# Patient Record
Sex: MALE | Race: WHITE | Employment: UNEMPLOYED | URBAN - NONMETROPOLITAN AREA
[De-identification: names, ages, dates, MRNs, and addresses within clinical notes are randomized per-mention and may not be internally consistent; named-entity substitution may affect disease eponyms.]

---

## 2018-07-01 ENCOUNTER — HOSPITAL ENCOUNTER (INPATIENT)
Age: 28
LOS: 1 days | Discharge: HOME | DRG: 383 | End: 2018-07-02
Attending: EMERGENCY MEDICINE | Admitting: INTERNAL MEDICINE
Payer: MEDICAID

## 2018-07-01 ENCOUNTER — APPOINTMENT (OUTPATIENT)
Dept: GENERAL RADIOLOGY | Age: 28
DRG: 383 | End: 2018-07-01
Payer: MEDICAID

## 2018-07-01 DIAGNOSIS — L08.9 LEFT FOOT INFECTION: Primary | ICD-10-CM

## 2018-07-01 PROBLEM — L03.119 CELLULITIS OF FOOT: Status: ACTIVE | Noted: 2018-07-01

## 2018-07-01 LAB
ALBUMIN SERPL-MCNC: 3.3 G/DL (ref 3.5–5.2)
ALBUMIN/GLOBULIN RATIO: 1.1 (ref 1–2.5)
ALP BLD-CCNC: 52 U/L (ref 40–129)
ALT SERPL-CCNC: 41 U/L (ref 5–41)
AMPHETAMINE SCREEN URINE: NEGATIVE
ANION GAP SERPL CALCULATED.3IONS-SCNC: 12 MMOL/L (ref 9–17)
AST SERPL-CCNC: 30 U/L
BARBITURATE SCREEN URINE: NEGATIVE
BENZODIAZEPINE SCREEN, URINE: POSITIVE
BILIRUB SERPL-MCNC: 0.52 MG/DL (ref 0.3–1.2)
BUN BLDV-MCNC: 13 MG/DL (ref 6–20)
BUN/CREAT BLD: 22 (ref 9–20)
BUPRENORPHINE URINE: POSITIVE
CALCIUM SERPL-MCNC: 7.9 MG/DL (ref 8.6–10.4)
CANNABINOID SCREEN URINE: NEGATIVE
CHLORIDE BLD-SCNC: 104 MMOL/L (ref 98–107)
CO2: 27 MMOL/L (ref 20–31)
COCAINE METABOLITE, URINE: POSITIVE
CREAT SERPL-MCNC: 0.6 MG/DL (ref 0.7–1.2)
GFR AFRICAN AMERICAN: >60 ML/MIN
GFR NON-AFRICAN AMERICAN: >60 ML/MIN
GFR SERPL CREATININE-BSD FRML MDRD: ABNORMAL ML/MIN/{1.73_M2}
GFR SERPL CREATININE-BSD FRML MDRD: ABNORMAL ML/MIN/{1.73_M2}
GLUCOSE BLD-MCNC: 112 MG/DL (ref 70–99)
HCT VFR BLD CALC: 38 % (ref 40.7–50.3)
HEMOGLOBIN: 13.1 G/DL (ref 13–17)
MCH RBC QN AUTO: 29.9 PG (ref 25.2–33.5)
MCHC RBC AUTO-ENTMCNC: 34.5 G/DL (ref 28.4–34.8)
MCV RBC AUTO: 86.8 FL (ref 82.6–102.9)
MDMA URINE: ABNORMAL
METHADONE SCREEN, URINE: NEGATIVE
METHAMPHETAMINE, URINE: NEGATIVE
NRBC AUTOMATED: 0 PER 100 WBC
OPIATES, URINE: POSITIVE
OXYCODONE SCREEN URINE: NEGATIVE
PDW BLD-RTO: 12.2 % (ref 11.8–14.4)
PHENCYCLIDINE, URINE: NEGATIVE
PLATELET # BLD: 228 K/UL (ref 138–453)
PMV BLD AUTO: 10.6 FL (ref 8.1–13.5)
POTASSIUM SERPL-SCNC: 3.6 MMOL/L (ref 3.7–5.3)
PROPOXYPHENE, URINE: NEGATIVE
RBC # BLD: 4.38 M/UL (ref 4.21–5.77)
SODIUM BLD-SCNC: 143 MMOL/L (ref 135–144)
TEST INFORMATION: ABNORMAL
TOTAL PROTEIN: 6.2 G/DL (ref 6.4–8.3)
TRICYCLIC ANTIDEPRESSANTS, UR: NEGATIVE
WBC # BLD: 5.8 K/UL (ref 3.5–11.3)

## 2018-07-01 PROCEDURE — 73630 X-RAY EXAM OF FOOT: CPT

## 2018-07-01 PROCEDURE — 1200000000 HC SEMI PRIVATE

## 2018-07-01 PROCEDURE — 2580000003 HC RX 258: Performed by: INTERNAL MEDICINE

## 2018-07-01 PROCEDURE — 99284 EMERGENCY DEPT VISIT MOD MDM: CPT

## 2018-07-01 PROCEDURE — 6360000002 HC RX W HCPCS: Performed by: INTERNAL MEDICINE

## 2018-07-01 PROCEDURE — 6360000002 HC RX W HCPCS: Performed by: EMERGENCY MEDICINE

## 2018-07-01 PROCEDURE — 2580000003 HC RX 258: Performed by: EMERGENCY MEDICINE

## 2018-07-01 PROCEDURE — 96375 TX/PRO/DX INJ NEW DRUG ADDON: CPT

## 2018-07-01 PROCEDURE — 96365 THER/PROPH/DIAG IV INF INIT: CPT

## 2018-07-01 PROCEDURE — G0378 HOSPITAL OBSERVATION PER HR: HCPCS

## 2018-07-01 PROCEDURE — 80306 DRUG TEST PRSMV INSTRMNT: CPT

## 2018-07-01 PROCEDURE — 85027 COMPLETE CBC AUTOMATED: CPT

## 2018-07-01 PROCEDURE — 96366 THER/PROPH/DIAG IV INF ADDON: CPT

## 2018-07-01 PROCEDURE — 96376 TX/PRO/DX INJ SAME DRUG ADON: CPT

## 2018-07-01 PROCEDURE — 87040 BLOOD CULTURE FOR BACTERIA: CPT

## 2018-07-01 PROCEDURE — 80053 COMPREHEN METABOLIC PANEL: CPT

## 2018-07-01 PROCEDURE — 36415 COLL VENOUS BLD VENIPUNCTURE: CPT

## 2018-07-01 RX ORDER — SODIUM CHLORIDE 9 MG/ML
INJECTION, SOLUTION INTRAVENOUS CONTINUOUS
Status: DISCONTINUED | OUTPATIENT
Start: 2018-07-01 | End: 2018-07-02 | Stop reason: HOSPADM

## 2018-07-01 RX ORDER — FAMOTIDINE 20 MG/1
20 TABLET, FILM COATED ORAL 2 TIMES DAILY
Status: DISCONTINUED | OUTPATIENT
Start: 2018-07-01 | End: 2018-07-02 | Stop reason: HOSPADM

## 2018-07-01 RX ORDER — CALCIUM CARBONATE 200(500)MG
2 TABLET,CHEWABLE ORAL DAILY
Status: DISCONTINUED | OUTPATIENT
Start: 2018-07-01 | End: 2018-07-02 | Stop reason: HOSPADM

## 2018-07-01 RX ORDER — MORPHINE SULFATE 10 MG/ML
4 INJECTION, SOLUTION INTRAMUSCULAR; INTRAVENOUS
Status: DISCONTINUED | OUTPATIENT
Start: 2018-07-01 | End: 2018-07-02 | Stop reason: HOSPADM

## 2018-07-01 RX ORDER — IBUPROFEN 800 MG/1
800 TABLET ORAL EVERY 6 HOURS PRN
Status: DISCONTINUED | OUTPATIENT
Start: 2018-07-01 | End: 2018-07-02 | Stop reason: HOSPADM

## 2018-07-01 RX ORDER — MORPHINE SULFATE 4 MG/ML
4 INJECTION, SOLUTION INTRAMUSCULAR; INTRAVENOUS ONCE
Status: DISCONTINUED | OUTPATIENT
Start: 2018-07-01 | End: 2018-07-01

## 2018-07-01 RX ORDER — SODIUM CHLORIDE 0.9 % (FLUSH) 0.9 %
10 SYRINGE (ML) INJECTION PRN
Status: DISCONTINUED | OUTPATIENT
Start: 2018-07-01 | End: 2018-07-02 | Stop reason: HOSPADM

## 2018-07-01 RX ORDER — MORPHINE SULFATE 10 MG/ML
2 INJECTION, SOLUTION INTRAMUSCULAR; INTRAVENOUS
Status: DISCONTINUED | OUTPATIENT
Start: 2018-07-01 | End: 2018-07-02 | Stop reason: HOSPADM

## 2018-07-01 RX ORDER — ONDANSETRON 2 MG/ML
4 INJECTION INTRAMUSCULAR; INTRAVENOUS EVERY 6 HOURS PRN
Status: DISCONTINUED | OUTPATIENT
Start: 2018-07-01 | End: 2018-07-02 | Stop reason: HOSPADM

## 2018-07-01 RX ORDER — SODIUM CHLORIDE 0.9 % (FLUSH) 0.9 %
10 SYRINGE (ML) INJECTION EVERY 12 HOURS SCHEDULED
Status: DISCONTINUED | OUTPATIENT
Start: 2018-07-01 | End: 2018-07-02 | Stop reason: HOSPADM

## 2018-07-01 RX ADMIN — Medication 10 ML: at 20:12

## 2018-07-01 RX ADMIN — SODIUM CHLORIDE: 9 INJECTION, SOLUTION INTRAVENOUS at 20:14

## 2018-07-01 RX ADMIN — CEFAZOLIN SODIUM 1 G: 1 SOLUTION INTRAVENOUS at 20:12

## 2018-07-01 RX ADMIN — MORPHINE SULFATE 4 MG: 10 INJECTION INTRAVENOUS at 20:12

## 2018-07-01 RX ADMIN — MORPHINE SULFATE 4 MG: 10 INJECTION INTRAVENOUS at 22:19

## 2018-07-01 RX ADMIN — VANCOMYCIN HYDROCHLORIDE 1000 MG: 1 INJECTION, POWDER, LYOPHILIZED, FOR SOLUTION INTRAVENOUS at 16:58

## 2018-07-01 ASSESSMENT — ENCOUNTER SYMPTOMS
COLOR CHANGE: 1
RESPIRATORY NEGATIVE: 1
GASTROINTESTINAL NEGATIVE: 1

## 2018-07-01 ASSESSMENT — PAIN DESCRIPTION - LOCATION
LOCATION: LEG;FOOT
LOCATION: LEG

## 2018-07-01 ASSESSMENT — PAIN DESCRIPTION - PAIN TYPE
TYPE: ACUTE PAIN
TYPE: ACUTE PAIN

## 2018-07-01 ASSESSMENT — PAIN SCALES - GENERAL
PAINLEVEL_OUTOF10: 8
PAINLEVEL_OUTOF10: 4
PAINLEVEL_OUTOF10: 7

## 2018-07-01 ASSESSMENT — PAIN DESCRIPTION - ORIENTATION
ORIENTATION: LEFT;LOWER
ORIENTATION: LEFT

## 2018-07-01 ASSESSMENT — PAIN DESCRIPTION - DESCRIPTORS
DESCRIPTORS: NUMBNESS
DESCRIPTORS: THROBBING

## 2018-07-01 NOTE — ED PROVIDER NOTES
[facility] MED SURG  eMERGENCY dEPARTMENT eNCOUnter      Pt Name: Shemar Bentley  MRN: [id number]  [place] 1990  Date of evaluation: 7/1/2018  Provider: Gopi Sharma MD    CHIEF COMPLAINT       Chief Complaint   Patient presents with    Foot Pain     Onset 4 days ago, no known injury. C/o pain and redness, radiating to lower leg         HISTORY OF PRESENT ILLNESS   (Location/Symptom, Timing/Onset, Context/Setting, Quality, Duration, Modifying Factors, Severity)  Note limiting factors. Shemar Bentley is a 29 y.o. male who presents to the emergency department      HPIThis is a 70-year-old male who presents with left foot pain that began approximate 4 days ago. He denies any wound or injury that started the foot infection. He just noticed he had pain in the redness and swelling continued to worsen. He has been afebrile. Nursing Notes were reviewed. REVIEW OF SYSTEMS    (2-9 systems for level 4, 10 or more for level 5)     Review of Systems   Constitutional: Negative. HENT: Negative. Respiratory: Negative. Cardiovascular: Negative. Gastrointestinal: Negative. Musculoskeletal: Negative. Skin: Positive for color change and rash. Neurological: Negative. Hematological: Negative. Except as noted above the remainder of the review of systems was reviewed and negative.        PAST MEDICAL HISTORY     Past Medical History:   Diagnosis Date    HTN (hypertension) 12/10/2012    Morbid obesity ([address] 12/10/2012         SURGICAL HISTORY       Past Surgical History:   Procedure Laterality Date    WISDOM TOOTH EXTRACTION      age 25         CURRENT MEDICATIONS       Discharge Medication List as of 7/2/2018 12:39 PM      CONTINUE these medications which have NOT CHANGED    Details   calcium carbonate (TUMS) 500 MG chewable tablet Take 2 tablets by mouth daily      ibuprofen (ADVIL;MOTRIN) 200 MG tablet Take 800 mg by mouth every 6 hours as needed for Pain       omeprazole (PRILOSEC) 40 MG capsule Take 1 capsule by mouth 2 times daily. , Disp-60 capsule, R-3             ALLERGIES     Tramadol and Zithromax [azithromycin]    FAMILY HISTORY       Family History   Problem Relation Age of Onset    Diabetes Father     Diabetes Maternal Grandfather     Diabetes Paternal Grandmother     Diabetes Paternal Grandfather           SOCIAL HISTORY       Social History     Social History    Marital status: Single     Spouse name: N/A    Number of children: N/A    Years of education: N/A     Social History Main Topics    Smoking status: Current Every Day Smoker     Packs/day: 1.00     Types: Cigarettes     Last attempt to quit: 12/14/2012    Smokeless tobacco: Not on file    Alcohol use No    Drug use: No    Sexual activity: Not on file     Other Topics Concern    Not on file     Social History Narrative    No narrative on file       SCREENINGS    [place] Coma Scale  Eye Opening: Spontaneous  Best Verbal Response: Oriented  Best Motor Response: Obeys commands  [place] Coma Scale Score: 15        PHYSICAL EXAM    (up to 7 for level 4, 8 or more for level 5)     ED Triage Vitals [07/01/18 1322]   BP Temp Temp Source Pulse Resp SpO2 Height Weight   (!) 155/83 97.5 °F (36.4 °C) Tympanic 72 16 97 % 6' 5\" (1.956 m) (!) 325 lb (147.4 kg)       Physical Exam   Constitutional: He is oriented to person, place, and time. He appears well-developed and well-nourished. HENT:   Head: Normocephalic and atraumatic. Right Ear: External ear normal.   Left Ear: External ear normal.   Nose: Nose normal.   Eyes: Conjunctivae and EOM are normal. Pupils are equal, round, and reactive to light. Neck: Normal range of motion. Neck supple. Cardiovascular: Normal rate, regular rhythm, normal heart sounds and intact distal pulses. Pulmonary/Chest: Effort normal and breath sounds normal.   Abdominal: Soft. Bowel sounds are normal. There is no tenderness. Musculoskeletal: Normal range of motion. IMMATURE PLATELET FRACTION   URINE DRUG SCREEN       All other labs were within normal range or not returned as of this dictation. EMERGENCY DEPARTMENT COURSE and DIFFERENTIAL DIAGNOSIS/MDM:   Vitals:    Vitals:    07/01/18 1322 07/01/18 1930 07/02/18 0037 07/02/18 0743   BP: (!) 155/83 (!) 159/91 126/73 137/72   Pulse: 72 68 70 68   Resp: 16 16 16 16   Temp: 97.5 °F (36.4 °C) 98 °F (36.7 °C) 98 °F (36.7 °C) 97.5 °F (36.4 °C)   TempSrc: Tympanic Temporal Temporal Temporal   SpO2: 97% 98% 98% 98%   Weight: (!) 325 lb (147.4 kg) (!) 424 lb (192.3 kg) (!) 425 lb (192.8 kg)    Height: 6' 5\" (1.956 m)            MDM          Procedures    FINAL IMPRESSION      1. Left foot infection          DISPOSITION/PLAN   DISPOSITION Admitted 07/01/2018 06:58:15 PM      PATIENT REFERRED TO:  WEST Lawrence CNP  [address] [place] [address]  [phone]    Go on 7/12/2018  at 2:00pm      DISCHARGE MEDICATIONS:  Discharge Medication List as of 7/2/2018 12:39 PM      START taking these medications    Details   clindamycin (CLEOCIN) 300 MG capsule Take 2 capsules by mouth 3 times daily for 10 days, Disp-60 capsule, R-0Normal                    Summation      Patient Course:      ED Medications administered this visit:    Medications   vancomycin 1000 mg IVPB in 250 mL D5W addavial (0 mg Intravenous Stopped 7/1/18 1806)       New Prescriptions from this visit:    Discharge Medication List as of 7/2/2018 12:39 PM      START taking these medications    Details   clindamycin (CLEOCIN) 300 MG capsule Take 2 capsules by mouth 3 times daily for 10 days, Disp-60 capsule, R-0Normal             Follow-up:  WEST Lawrence CNP  [address] [place] [address]  [phone]    Go on 7/12/2018  at 2:00pm        Final Impression:   1.  Left foot infection               (Please note that portions of this note were completed with a voice recognition program.  Efforts were made to edit the dictations but occasionally words are mis-transcribed.)           Kya Vilchis MD  07/01/18 [address] MD  07/01/18 [address], MD  07/13/18 8815

## 2018-07-01 NOTE — ED NOTES
IV attempted x 2, without success after missed attempts x 2 by WINTER Hall. Pt calm, cooperative.      Smith Joya RN  07/01/18 2260

## 2018-07-01 NOTE — ED NOTES
In room to discuss patient concerns with him. Verbalized understanding of issues. Explained complications of cellulitis including sepsis and potential disability due to infection. Also voiced that due to difficulty of IV access we didn't want his condition to deteriorate. Asked when IV ATB would be completed. Advised patient pump would alarm and he should call nursing staff.  Favio Salmon RN

## 2018-07-02 ENCOUNTER — APPOINTMENT (OUTPATIENT)
Dept: VASCULAR LAB | Age: 28
DRG: 383 | End: 2018-07-02
Payer: MEDICAID

## 2018-07-02 VITALS
DIASTOLIC BLOOD PRESSURE: 72 MMHG | TEMPERATURE: 97.5 F | HEIGHT: 77 IN | HEART RATE: 68 BPM | OXYGEN SATURATION: 98 % | SYSTOLIC BLOOD PRESSURE: 137 MMHG | WEIGHT: 315 LBS | RESPIRATION RATE: 16 BRPM | BODY MASS INDEX: 37.19 KG/M2

## 2018-07-02 PROBLEM — L03.116 CELLULITIS OF FOOT, LEFT: Status: ACTIVE | Noted: 2018-07-01

## 2018-07-02 PROBLEM — F19.20 POLYDRUG DEPENDENCE INCLUDING OPIOID TYPE DRUG, EPISODIC ABUSE (HCC): Chronic | Status: ACTIVE | Noted: 2018-07-02

## 2018-07-02 PROBLEM — F11.20 POLYDRUG DEPENDENCE INCLUDING OPIOID TYPE DRUG, EPISODIC ABUSE (HCC): Chronic | Status: ACTIVE | Noted: 2018-07-02

## 2018-07-02 PROBLEM — Z78.9 PROBLEM WITH VASCULAR ACCESS: Status: ACTIVE | Noted: 2018-07-02

## 2018-07-02 LAB
ABSOLUTE EOS #: 0.48 K/UL (ref 0–0.44)
ABSOLUTE IMMATURE GRANULOCYTE: 0 K/UL (ref 0–0.3)
ABSOLUTE LYMPH #: 1.85 K/UL (ref 1.1–3.7)
ABSOLUTE MONO #: 0.22 K/UL (ref 0.1–1.2)
BASOPHILS # BLD: 0 % (ref 0–2)
BASOPHILS ABSOLUTE: 0 K/UL (ref 0–0.2)
DIFFERENTIAL TYPE: ABNORMAL
EOSINOPHILS RELATIVE PERCENT: 11 % (ref 1–4)
HCT VFR BLD CALC: 35.8 % (ref 40.7–50.3)
HEMOGLOBIN: 12.3 G/DL (ref 13–17)
IMMATURE GRANULOCYTES: 0 %
LYMPHOCYTES # BLD: 42 % (ref 24–43)
MCH RBC QN AUTO: 29.6 PG (ref 25.2–33.5)
MCHC RBC AUTO-ENTMCNC: 34.4 G/DL (ref 28.4–34.8)
MCV RBC AUTO: 86.3 FL (ref 82.6–102.9)
MONOCYTES # BLD: 5 % (ref 3–12)
MORPHOLOGY: NORMAL
NRBC AUTOMATED: 0 PER 100 WBC
PDW BLD-RTO: 12.1 % (ref 11.8–14.4)
PLATELET # BLD: ABNORMAL K/UL (ref 138–453)
PLATELET ESTIMATE: ABNORMAL
PLATELET, FLUORESCENCE: 143 K/UL (ref 138–453)
PLATELET, IMMATURE FRACTION: 6.5 % (ref 1.1–10.3)
PMV BLD AUTO: ABNORMAL FL (ref 8.1–13.5)
RBC # BLD: 4.15 M/UL (ref 4.21–5.77)
RBC # BLD: ABNORMAL 10*6/UL
SEG NEUTROPHILS: 42 % (ref 36–65)
SEGMENTED NEUTROPHILS ABSOLUTE COUNT: 1.85 K/UL (ref 1.5–8.1)
WBC # BLD: 4.4 K/UL (ref 3.5–11.3)
WBC # BLD: ABNORMAL 10*3/UL

## 2018-07-02 PROCEDURE — 96367 TX/PROPH/DG ADDL SEQ IV INF: CPT

## 2018-07-02 PROCEDURE — 36415 COLL VENOUS BLD VENIPUNCTURE: CPT

## 2018-07-02 PROCEDURE — G0378 HOSPITAL OBSERVATION PER HR: HCPCS

## 2018-07-02 PROCEDURE — 6360000002 HC RX W HCPCS: Performed by: INTERNAL MEDICINE

## 2018-07-02 PROCEDURE — 96376 TX/PRO/DX INJ SAME DRUG ADON: CPT

## 2018-07-02 PROCEDURE — 85025 COMPLETE CBC W/AUTO DIFF WBC: CPT

## 2018-07-02 PROCEDURE — 93971 EXTREMITY STUDY: CPT

## 2018-07-02 RX ORDER — CLINDAMYCIN HYDROCHLORIDE 300 MG/1
600 CAPSULE ORAL 3 TIMES DAILY
Qty: 60 CAPSULE | Refills: 0 | Status: SHIPPED | OUTPATIENT
Start: 2018-07-02 | End: 2018-07-12

## 2018-07-02 RX ADMIN — CEFAZOLIN SODIUM 1 G: 1 SOLUTION INTRAVENOUS at 03:35

## 2018-07-02 RX ADMIN — MORPHINE SULFATE 4 MG: 10 INJECTION INTRAVENOUS at 07:45

## 2018-07-02 RX ADMIN — MORPHINE SULFATE 4 MG: 10 INJECTION INTRAVENOUS at 00:37

## 2018-07-02 RX ADMIN — MORPHINE SULFATE 4 MG: 10 INJECTION INTRAVENOUS at 03:35

## 2018-07-02 ASSESSMENT — PAIN SCALES - GENERAL
PAINLEVEL_OUTOF10: 7

## 2018-07-02 NOTE — PROGRESS NOTES
Writer has made the patient comfortable with pillow support and the patient is resting quietly in the bed with his eyes closed. Patient had stated earlier that he might need something for sleep as he had not been able to get good rest lately but patient is currently sleeping in bed and was barely able to stay awake when writer brought in his new ID band.

## 2018-07-02 NOTE — PROGRESS NOTES
Patient woke and requested his PRN morphine. After giving the med, patient stated \"can you wake me up for my next one at 2? \" Writer explained we are not allowed to wake up patients for PRN medications. Patient was compliant and said he understand it was ok since if he was sleeping he wouldn't be feeling pain. Patient is now resting with eyes closed again.

## 2018-07-02 NOTE — H&P
Hospital Medicine  History and Physical    Patient:  Мария Jacobo  MRN: [id number]    Chief Complaint:  Left foot pain    History Obtained From:  patient  PCP: No primary care provider on file. History of Present Illness: The patient is a 29 y.o. male who presents with pain redness and swelling left foot. Started on 6/27. Denies injury. Seen in ER and admitted for IV antibiotic therapy. Past Medical History:        Diagnosis Date    HTN (hypertension) 12/10/2012    Morbid obesity ([address] 12/10/2012       Past Surgical History:        Procedure Laterality Date    WISDOM TOOTH EXTRACTION      age 25       Medications Prior to Admission:    Prior to Admission medications    Medication Sig Start Date End Date Taking? Authorizing Provider   HYDROcodone-acetaminophen (NORCO) 5-325 MG per tablet Take 1 tablet by mouth every 4 hours as needed for Pain 7/12/16   Lindsay Ch MD   calcium carbonate (TUMS) 500 MG chewable tablet Take 2 tablets by mouth daily    Historical Provider, MD   ibuprofen (ADVIL;MOTRIN) 200 MG tablet Take 800 mg by mouth every 6 hours as needed for Pain     Historical Provider, MD   omeprazole (PRILOSEC) 40 MG capsule Take 1 capsule by mouth 2 times daily. 12/10/12   WEST Bauer - CNP       Allergies:  Tramadol and Zithromax [azithromycin]    Social History:   TOBACCO:   reports that he has been smoking Cigarettes. He has been smoking about 1.00 pack per day. He does not have any smokeless tobacco history on file. ETOH:   reports that he does not drink alcohol.   OCCUPATION: none    Family History:   Family History   Problem Relation Age of Onset    Diabetes Father     Diabetes Maternal Grandfather     Diabetes Paternal Grandmother     Diabetes Paternal Grandfather        Review of Systems:  Constitutional: negative for anorexia, chills, fatigue, fevers, malaise, sweats and weight loss  Eyes: negative  Ears, nose, mouth, throat, and face: negative  Respiratory: negative for asthma, chronic bronchitis, cough, dyspnea on exertion, emphysema, hemoptysis, pleurisy/chest pain, pneumonia, shortness of breath, sputum, stridor and wheezing  Cardiovascular: negative for chest pain, chest pressure/discomfort, claudication, dyspnea, exertional chest pressure/discomfort, fatigue, irregular heart beat, lower extremity edema, near-syncope, orthopnea, palpitations, paroxysmal nocturnal dyspnea, syncope and tachypnea  Gastrointestinal: negative for abdominal pain, change in bowel habits, constipation, diarrhea, dyspepsia, dysphagia, jaundice, melena, nausea, odynophagia, reflux symptoms and vomiting  Genitourinary:negative for decreased stream, dysuria, frequency, hematuria, hesitancy, nocturia and urinary incontinence  Integument/breast: negative for changed mole, nipple discharge, pruritus, rash, skin color change and skin lesion(s)  Hematologic/lymphatic: negative for bleeding, easy bruising, lymphadenopathy and petechiae  Musculoskeletal:negative for arthralgias, back pain, bone pain, muscle weakness, myalgias, neck pain and stiff joints  Neurological: negative  Behavioral/Psych: negative  All other systems were reviewed with the patient and are negative except as stated  Physical Exam:    Vitals:   Vitals:    07/02/18 0037   BP: 126/73   Pulse: 70   Resp: 16   Temp: 98 °F (36.7 °C)   SpO2: 98%     Weight: (!) 425 lb (192.8 kg)   Height: 6' 5\" (195.6 cm)   GEN:   A & O x3, no apparent distress  EYES: No gross abnormalities.   NECK: normal, supple, no lymphadenopathy,  no carotid bruits  PULM: clear to auscultation bilaterally- no wheezes, rales or rhonchi, normal air movement, no respiratory distress  COR: regular rate & rhythm, no murmurs and no gallops  ABD:  soft, non-tender, non-distended, normal bowel sounds, no masses or organomegaly  EXT:   Erythema, pain swelling in left foot with redness up left leg  NEURO: negative  SKIN:  See

## 2018-07-02 NOTE — PROGRESS NOTES
Discussed discharge plans with the patient. Patient is a 29year old male here with Cellulitis of foot. He is alert and oriented. Patient is single and lives with his mother. He uses no medical equipment. Patient stated he cooks his food and cleans up after himself. He takes no medicine and does not drive. His family does the driving. He does not have a PCP and he will be set up with the [facility]. Patient has no insurance and the HELP program will talk with the patient. He may need help getting his medications on discharge. The discharge plan is home with no services. Ask if he wants help to get clean from his drug use . Patient stated No. Explain if he changes his mind, we can get him help.     TYRONE Chavez

## 2018-07-02 NOTE — PROGRESS NOTES
Hospital Discharge Instructions           Sundeep Mcekon         Name: Marco Route  Date:  7/2/18  Diagnosis: Cellulitis of foot [L03.119]       Follow - up Appointments:    Doctor  Location  Date  Time  Phone #  What to bring   [address] [address] 7/12/18 2:00pm [phone]                                Testing:    Doctor  Location  Date  Time  Phone #  What to bring                                        []   You have had new medications prescribed to you. Please see attached list in the After Visit Summary. []   You have had changes made to your medications. Please see attached list in the After Visit Summary.

## 2018-07-02 NOTE — PLAN OF CARE
Problem: Nutrition  Goal: Optimal nutrition therapy  Outcome: Ongoing  Nutrition Problem: Overweight/Obese  Intervention: Food and/or Nutrient Delivery: Continue current diet  Nutritional Goals: PO > 75% of meals

## 2018-07-02 NOTE — PROGRESS NOTES
LLE doppler negative for DVT. Discharging home on 10 days clindamycin. He will f/u with [facility]. Advised him to elevate his leg.     Xiomara Celaya PA-C  7/2/2018, 12:24 PM

## 2018-07-02 NOTE — PROGRESS NOTES
Anesthesia attempts IJ without success. Patient states \"you're not trying that again. \" Patient asks this nurse, \"Can the doctor just prescribe me some medication and let me go? Because I'm not getting poked again. \" Assured patient I would speak with physician. Zuleyka Wolf notified.

## 2018-07-02 NOTE — DISCHARGE SUMMARY
Patient Status: In Patient. Comments:INDICATIONS: R/O DVT, Patient reports of pain and redness of left lower extremity Patient admitted with cellulitis of the left foot. Conclusions   Summary   No evidence of superficial or deep venous thrombosis in the left lower  extremity. Signature   ----------------------------------------------------------------  Electronically signed by ALYSA Weir(Sonographer) on  07/02/2018 12:11 PM  ----------------------------------------------------------------   ----------------------------------------------------------------  Electronically signed by Titi Beckman(Interpreting  physician) on 07/02/2018 12:13 PM  ----------------------------------------------------------------  Findings:   Right Impression:               Left Impression:  Bilateral lower extremity       The common femoral, femoral, deep femoral,  venous scan was ordered, but    popliteal, tibials, peroneal, and  patient refused the right lower saphenous veins were evaluated. Poor  extremity to be evaluated. resolution of the peroneal veins was                                  noted. All other veins were compressible with                                  normal doppler responses. Edema was noted in the ankle and calf. Prominent lymph nodes were seen in the                                  groin. Velocities are measured in cm/s ; Diameters are measured in cm Left Lower Extremities DVT Study Measurements Left 2D Measurements +------------------------------------+----------+---------------+----------+ ! Location                            ! Visualized! Compressibility! Thrombosis! +------------------------------------+----------+---------------+----------+ ! Common Femoral                      !Yes       ! Yes            ! None      ! +------------------------------------+----------+---------------+----------+ !Prox Femoral                        !Yes       ! Yes            ! None      ! +------------------------------------+----------+---------------+----------+ ! Mid Femoral                         !Yes       ! Yes            ! None      ! +------------------------------------+----------+---------------+----------+ ! Dist Femoral                        !Yes       ! Yes            ! None      ! +------------------------------------+----------+---------------+----------+ ! Deep Femoral                        !Yes       ! Yes            ! None      ! +------------------------------------+----------+---------------+----------+ ! Popliteal                           !Yes       ! Yes            ! None      ! +------------------------------------+----------+---------------+----------+ ! Sapheno Femoral Junction            ! Yes       ! Yes            ! None      ! +------------------------------------+----------+---------------+----------+ ! PTV                                 ! Yes       ! Yes            ! None      ! +------------------------------------+----------+---------------+----------+ ! Peroneal                            !No        !               !          ! +------------------------------------+----------+---------------+----------+ ! Gastroc                             ! Yes       ! Yes            ! None      ! +------------------------------------+----------+---------------+----------+ ! GSV Thigh                           ! Yes       ! Yes            ! None      ! +------------------------------------+----------+---------------+----------+ ! GSV Knee                            ! Yes       ! Yes            ! None      ! +------------------------------------+----------+---------------+----------+ ! GSV Ankle                           ! Yes       ! Yes            ! None      ! +------------------------------------+----------+---------------+----------+ ! SSV                                 ! Yes       ! Yes            ! None      ! by mouth 2 times daily. Patient Instructions:    Activity: activity as tolerated  Diet: regular diet  Wound Care: elevate LLE  Other: none    Disposition:   Discharge to Home    Follow up:  Patient will be followed by new PCP in 1-2 weeks    CORE MEASURES on Discharge (if applicable)  ACE/ARB in CHF: NA  Statin in MI: NA  ASA in MI: NA  Statin in CVA: NA  Antiplatelet in CVA: NA    Total time spent on discharge services: 25 minutes    Including the following activities:  Evaluation and Management of patient  Discussion with patient and/or surrogate about current care plan  Coordination with Case Management and/or   Coordination of care with Consultants (if applicable)   Coordination of care with Receiving Facility Physician (if applicable)  Completion of DME forms (if applicable)  Preparation of Discharge Summary  Preparation of Medication Reconciliation  Preparation of Discharge Prescriptions    Signed:  Loly Jiang PA-C  7/2/2018, 12:38 PM

## 2018-07-02 NOTE — DISCHARGE SUMMARY
Eugenia Navas. Jak Guillory  Physician Assistant Attestation Note For Discharge 7/2/18    I personally evaluated and examined the patient face-to-face in conjunction with the PA and agree with the management and dispostition of the patient. Please see PA's discharge summary note for full details. My key findings are:     SUBJECTIVE:     Patient admitted for cellulitis of left foot       OBJECTIVE:    Vitals:   Temp: 97.5 °F (36.4 °C)  BP: 137/72  Resp: 16  Pulse: 68  SpO2: 98 %    24HR INTAKE/OUTPUT:    Intake/Output Summary (Last 24 hours) at 07/02/18 1352  Last data filed at 07/02/18 0800   Gross per 24 hour   Intake             1245 ml   Output                0 ml   Net             1245 ml     -----------------------------------------------------------------  Exam:  GEN:    Awake, alert and oriented x 3. no acute distress  EYES:  EOMI, pupils equal   NECK: Supple. No lymphadenopathy. No carotid bruit  CVS:    RRR, no murmur, rub or gallop  PULM: CTA, no wheezes, rales or rhonchi  ABD:    Bowels sounds normal.  Abdomen is soft. No distention. No tenderness. EXT:   no edema bilaterally . No calf tenderness. NEURO: Motor and sensory are intact  SKIN:  No rashes. No skin lesions.       Diagnostic Data:    · All available data reviewed  Lab Results   Component Value Date    WBC 4.4 07/02/2018    HGB 12.3 (L) 07/02/2018    MCV 86.3 07/02/2018    PLT See Reflexed IPF Result 07/02/2018    Lab Results   Component Value Date    GLUCOSE 112 (H) 07/01/2018    BUN 13 07/01/2018    CREATININE 0.60 (L) 07/01/2018     07/01/2018    K 3.6 (L) 07/01/2018    CALCIUM 7.9 (L) 07/01/2018     07/01/2018    CO2 27 07/01/2018       ASSESSMENT:    · cellulitis    PLAN:  · I agree with the plan as outlined in the PA's note  ·     Khushboo Ambrosio M.D.  7/2/2018  1:52 PM

## 2018-07-06 LAB
CULTURE: NORMAL
Lab: NORMAL
SPECIMEN DESCRIPTION: NORMAL
STATUS: NORMAL

## 2018-08-10 ENCOUNTER — HOSPITAL ENCOUNTER (EMERGENCY)
Age: 28
Discharge: HOME | End: 2018-08-10
Attending: EMERGENCY MEDICINE
Payer: MEDICAID

## 2018-08-10 VITALS
TEMPERATURE: 97.2 F | OXYGEN SATURATION: 98 % | DIASTOLIC BLOOD PRESSURE: 88 MMHG | RESPIRATION RATE: 18 BRPM | WEIGHT: 315 LBS | HEART RATE: 74 BPM | SYSTOLIC BLOOD PRESSURE: 178 MMHG | HEIGHT: 77 IN | BODY MASS INDEX: 37.19 KG/M2

## 2018-08-10 DIAGNOSIS — L03.115 CELLULITIS OF RIGHT FOOT: Primary | ICD-10-CM

## 2018-08-10 PROCEDURE — 99282 EMERGENCY DEPT VISIT SF MDM: CPT

## 2018-08-10 RX ORDER — DOXYCYCLINE HYCLATE 100 MG
100 TABLET ORAL 2 TIMES DAILY
Qty: 20 TABLET | Refills: 0 | Status: SHIPPED | OUTPATIENT
Start: 2018-08-10 | End: 2018-08-20

## 2018-08-10 ASSESSMENT — PAIN DESCRIPTION - LOCATION: LOCATION: FOOT

## 2018-08-10 ASSESSMENT — PAIN DESCRIPTION - ORIENTATION: ORIENTATION: RIGHT

## 2018-08-10 ASSESSMENT — PAIN DESCRIPTION - PAIN TYPE: TYPE: ACUTE PAIN

## 2018-08-10 ASSESSMENT — PAIN SCALES - GENERAL: PAINLEVEL_OUTOF10: 4

## 2018-08-10 NOTE — ED PROVIDER NOTES
[facility] ED  eMERGENCY dEPARTMENT eNCOUnter      Pt Name: Charisma Hearn  MRN: [id number]  [place] 1990  Date of evaluation: 8/10/2018  Provider: Arminda Valentin MD    CHIEF COMPLAINT       Chief Complaint   Patient presents with    Wound Check     right foot wound patient was seen about a month ago for same thing but it has not healed yet. he was given an atb but did not get it filled due to cost.        Aki Ford is a 29 y.o. male who presents to the emergency department For evaluation of a wound on his right foot. Symptoms have been present for approximately one month. He states they are improving. He was admitted for cellulitis of the right leg due to injecting heroin in the right foot. This was approximately one month ago. He states he did not fill his antibiotics once he was discharged due to Shade Boozer. Patient states has been improving but still present. He states he does have a discharge occasionally. He denies any history of diabetes. PAST MEDICAL HISTORY     Past Medical History:   Diagnosis Date    HTN (hypertension) 12/10/2012    Morbid obesity ([address] 12/10/2012       SURGICAL HISTORY       Past Surgical History:   Procedure Laterality Date    WISDOM TOOTH EXTRACTION      age 25       CURRENT MEDICATIONS       Previous Medications    CALCIUM CARBONATE (TUMS) 500 MG CHEWABLE TABLET    Take 2 tablets by mouth daily    IBUPROFEN (ADVIL;MOTRIN) 200 MG TABLET    Take 800 mg by mouth every 6 hours as needed for Pain     OMEPRAZOLE (PRILOSEC) 40 MG CAPSULE    Take 1 capsule by mouth 2 times daily.        ALLERGIES     Tramadol and Zithromax [azithromycin]    FAMILY HISTORY       Family History   Problem Relation Age of Onset    Diabetes Father     Diabetes Maternal Grandfather     Diabetes Paternal Grandmother     Diabetes Paternal Grandfather         SOCIAL HISTORY       Social History     Social History    Marital status: Single Making: On evaluation, patient is in no distress. Patient has very small wound to right foot with minimal surrounding erythema and discharge. Due to IV drug abuse, patient was started on doxycycline for treatment of mild cellulitis from the wound. Patient has normal heart rate and is afebrile. No evidence of sepsis. He was instructed to follow up with [facility] primary care as calling to schedule an appointment in the next 2-5 days. Return precautions were discussed. ED Medications administered this visit:  Medications - No data to display    CLINICAL IMPRESSION      1.  Cellulitis of right foot          DISPOSITION/PLAN   DISPOSITION Decision To Discharge 08/10/2018 03:54:32 PM      PATIENT REFERRED TO:  [place] [address]  [phone]  Schedule an appointment as soon as possible for a visit in 1 week        DISCHARGE MEDICATIONS:  New Prescriptions    DOXYCYCLINE HYCLATE (VIBRA-TABS) 100 MG TABLET    Take 1 tablet by mouth 2 times daily for 10 days            Roxanne Wright MD (electronically signed)  Attending Emergency Department Provider           Roxanne Wright MD  08/10/18 6898

## 2019-12-02 ENCOUNTER — HOSPITAL ENCOUNTER (EMERGENCY)
Age: 29
Discharge: HOME | End: 2019-12-02
Payer: COMMERCIAL

## 2019-12-02 VITALS
BODY MASS INDEX: 47.43 KG/M2 | DIASTOLIC BLOOD PRESSURE: 66 MMHG | OXYGEN SATURATION: 97 % | TEMPERATURE: 96.3 F | WEIGHT: 315 LBS | RESPIRATION RATE: 18 BRPM | HEART RATE: 87 BPM | SYSTOLIC BLOOD PRESSURE: 145 MMHG

## 2019-12-02 DIAGNOSIS — B34.9 VIRAL ILLNESS: ICD-10-CM

## 2019-12-02 DIAGNOSIS — M79.10 MYALGIA: Primary | ICD-10-CM

## 2019-12-02 LAB
DIRECT EXAM: NORMAL
Lab: NORMAL
SPECIMEN DESCRIPTION: NORMAL

## 2019-12-02 PROCEDURE — 99283 EMERGENCY DEPT VISIT LOW MDM: CPT

## 2019-12-02 PROCEDURE — 87804 INFLUENZA ASSAY W/OPTIC: CPT

## 2019-12-02 RX ORDER — FLUTICASONE PROPIONATE 50 MCG
1 SPRAY, SUSPENSION (ML) NASAL DAILY
Qty: 1 BOTTLE | Refills: 0 | Status: SHIPPED | OUTPATIENT
Start: 2019-12-02 | End: 2019-12-04 | Stop reason: ALTCHOICE

## 2019-12-02 RX ORDER — IBUPROFEN 800 MG/1
800 TABLET ORAL EVERY 6 HOURS PRN
Qty: 21 TABLET | Refills: 0 | Status: SHIPPED | OUTPATIENT
Start: 2019-12-02 | End: 2019-12-04

## 2019-12-02 ASSESSMENT — PAIN SCALES - GENERAL: PAINLEVEL_OUTOF10: 4

## 2019-12-02 ASSESSMENT — PAIN DESCRIPTION - LOCATION: LOCATION: HEAD

## 2019-12-02 ASSESSMENT — PAIN DESCRIPTION - PAIN TYPE: TYPE: ACUTE PAIN

## 2019-12-04 ENCOUNTER — APPOINTMENT (OUTPATIENT)
Dept: ULTRASOUND IMAGING | Age: 29
End: 2019-12-04
Payer: COMMERCIAL

## 2019-12-04 ENCOUNTER — HOSPITAL ENCOUNTER (EMERGENCY)
Age: 29
Discharge: LEFT BEFORE DISCHARGE | End: 2019-12-04
Payer: COMMERCIAL

## 2019-12-04 ENCOUNTER — APPOINTMENT (OUTPATIENT)
Dept: CT IMAGING | Age: 29
End: 2019-12-04
Payer: COMMERCIAL

## 2019-12-04 VITALS
TEMPERATURE: 96 F | WEIGHT: 315 LBS | HEIGHT: 77 IN | DIASTOLIC BLOOD PRESSURE: 68 MMHG | SYSTOLIC BLOOD PRESSURE: 127 MMHG | HEART RATE: 96 BPM | OXYGEN SATURATION: 97 % | RESPIRATION RATE: 20 BRPM | BODY MASS INDEX: 37.19 KG/M2

## 2019-12-04 DIAGNOSIS — B17.10 ACUTE HEPATITIS C VIRUS INFECTION WITHOUT HEPATIC COMA: Primary | ICD-10-CM

## 2019-12-04 PROBLEM — B19.20 HEPATITIS C: Status: ACTIVE | Noted: 2019-12-04

## 2019-12-04 LAB
-: ABNORMAL
ABSOLUTE EOS #: 0 K/UL (ref 0–0.44)
ABSOLUTE IMMATURE GRANULOCYTE: 0 K/UL (ref 0–0.3)
ABSOLUTE LYMPH #: 1.79 K/UL (ref 1.1–3.7)
ABSOLUTE MONO #: 0.1 K/UL (ref 0.1–1.2)
ACETAMINOPHEN LEVEL: <5 UG/ML (ref 10–30)
ALBUMIN SERPL-MCNC: 3.5 G/DL (ref 3.5–5.2)
ALBUMIN/GLOBULIN RATIO: 1.1 (ref 1–2.5)
ALP BLD-CCNC: 141 U/L (ref 40–129)
ALT SERPL-CCNC: 2085 U/L (ref 5–41)
AMMONIA: 46 UMOL/L (ref 16–60)
AMORPHOUS: ABNORMAL
ANION GAP SERPL CALCULATED.3IONS-SCNC: 14 MMOL/L (ref 9–17)
AST SERPL-CCNC: 966 U/L
BACTERIA: ABNORMAL
BASOPHILS # BLD: 0 % (ref 0–2)
BASOPHILS ABSOLUTE: 0 K/UL (ref 0–0.2)
BILIRUB SERPL-MCNC: 12.18 MG/DL (ref 0.3–1.2)
BILIRUBIN URINE: ABNORMAL
BUN BLDV-MCNC: 16 MG/DL (ref 6–20)
BUN/CREAT BLD: 28 (ref 9–20)
CALCIUM SERPL-MCNC: 8.7 MG/DL (ref 8.6–10.4)
CASTS UA: ABNORMAL /LPF
CHLORIDE BLD-SCNC: 103 MMOL/L (ref 98–107)
CO2: 21 MMOL/L (ref 20–31)
COLOR: YELLOW
COMMENT UA: ABNORMAL
CREAT SERPL-MCNC: 0.57 MG/DL (ref 0.7–1.2)
CRYSTALS, UA: ABNORMAL /HPF
DIFFERENTIAL TYPE: ABNORMAL
EOSINOPHILS RELATIVE PERCENT: 0 % (ref 1–4)
EPITHELIAL CELLS UA: ABNORMAL /HPF (ref 0–5)
ETHANOL PERCENT: <0.01 %
ETHANOL: <10 MG/DL
GFR AFRICAN AMERICAN: >60 ML/MIN
GFR NON-AFRICAN AMERICAN: >60 ML/MIN
GFR SERPL CREATININE-BSD FRML MDRD: ABNORMAL ML/MIN/{1.73_M2}
GFR SERPL CREATININE-BSD FRML MDRD: ABNORMAL ML/MIN/{1.73_M2}
GLUCOSE BLD-MCNC: 133 MG/DL (ref 70–99)
GLUCOSE URINE: NEGATIVE
HCT VFR BLD CALC: 49.3 % (ref 40.7–50.3)
HEMOGLOBIN: 16.9 G/DL (ref 13–17)
IMMATURE GRANULOCYTES: 0 %
INR BLD: 1.2 (ref 0.9–1.2)
KETONES, URINE: NEGATIVE
LACTIC ACID, SEPSIS WHOLE BLOOD: NORMAL MMOL/L (ref 0.5–1.9)
LACTIC ACID, SEPSIS: 1.6 MMOL/L (ref 0.5–1.9)
LEUKOCYTE ESTERASE, URINE: NEGATIVE
LIPASE: 9 U/L (ref 13–60)
LYMPHOCYTES # BLD: 35 % (ref 24–43)
MCH RBC QN AUTO: 29.6 PG (ref 25.2–33.5)
MCHC RBC AUTO-ENTMCNC: 34.3 G/DL (ref 28.4–34.8)
MCV RBC AUTO: 86.3 FL (ref 82.6–102.9)
MONOCYTES # BLD: 2 % (ref 3–12)
MORPHOLOGY: NORMAL
MUCUS: ABNORMAL
NITRITE, URINE: NEGATIVE
NRBC AUTOMATED: 0 PER 100 WBC
OTHER OBSERVATIONS UA: ABNORMAL
PARTIAL THROMBOPLASTIN TIME: 27.6 SEC (ref 23.2–34.4)
PDW BLD-RTO: 13.2 % (ref 11.8–14.4)
PH UA: 6 (ref 5–9)
PLATELET # BLD: ABNORMAL K/UL (ref 138–453)
PLATELET ESTIMATE: ABNORMAL
PLATELET, FLUORESCENCE: 92 K/UL (ref 138–453)
PLATELET, IMMATURE FRACTION: 21 % (ref 1.1–10.3)
PMV BLD AUTO: ABNORMAL FL (ref 8.1–13.5)
POTASSIUM SERPL-SCNC: 3.2 MMOL/L (ref 3.7–5.3)
PROTEIN UA: ABNORMAL
PROTHROMBIN TIME: 12 SEC (ref 9.7–12.2)
RBC # BLD: 5.71 M/UL (ref 4.21–5.77)
RBC # BLD: ABNORMAL 10*6/UL
RBC UA: ABNORMAL /HPF (ref 0–2)
RENAL EPITHELIAL, UA: ABNORMAL /HPF
SALICYLATE LEVEL: <1 MG/DL (ref 3–10)
SEG NEUTROPHILS: 63 % (ref 36–65)
SEGMENTED NEUTROPHILS ABSOLUTE COUNT: 3.21 K/UL (ref 1.5–8.1)
SODIUM BLD-SCNC: 138 MMOL/L (ref 135–144)
SPECIFIC GRAVITY UA: 1.01 (ref 1.01–1.02)
TOTAL PROTEIN: 6.6 G/DL (ref 6.4–8.3)
TRICHOMONAS: ABNORMAL
TURBIDITY: CLEAR
URINE HGB: NEGATIVE
UROBILINOGEN, URINE: ABNORMAL
WBC # BLD: 5.1 K/UL (ref 3.5–11.3)
WBC # BLD: ABNORMAL 10*3/UL
WBC UA: ABNORMAL /HPF (ref 0–5)
YEAST: ABNORMAL

## 2019-12-04 PROCEDURE — 93005 ELECTROCARDIOGRAM TRACING: CPT | Performed by: EMERGENCY MEDICINE

## 2019-12-04 PROCEDURE — 85610 PROTHROMBIN TIME: CPT

## 2019-12-04 PROCEDURE — 81001 URINALYSIS AUTO W/SCOPE: CPT

## 2019-12-04 PROCEDURE — 6360000004 HC RX CONTRAST MEDICATION: Performed by: PHYSICIAN ASSISTANT

## 2019-12-04 PROCEDURE — 76705 ECHO EXAM OF ABDOMEN: CPT

## 2019-12-04 PROCEDURE — 80307 DRUG TEST PRSMV CHEM ANLYZR: CPT

## 2019-12-04 PROCEDURE — 85025 COMPLETE CBC W/AUTO DIFF WBC: CPT

## 2019-12-04 PROCEDURE — G0480 DRUG TEST DEF 1-7 CLASSES: HCPCS

## 2019-12-04 PROCEDURE — 99285 EMERGENCY DEPT VISIT HI MDM: CPT

## 2019-12-04 PROCEDURE — 85055 RETICULATED PLATELET ASSAY: CPT

## 2019-12-04 PROCEDURE — 83605 ASSAY OF LACTIC ACID: CPT

## 2019-12-04 PROCEDURE — 82140 ASSAY OF AMMONIA: CPT

## 2019-12-04 PROCEDURE — 2580000003 HC RX 258: Performed by: PHYSICIAN ASSISTANT

## 2019-12-04 PROCEDURE — 83690 ASSAY OF LIPASE: CPT

## 2019-12-04 PROCEDURE — 87086 URINE CULTURE/COLONY COUNT: CPT

## 2019-12-04 PROCEDURE — 36415 COLL VENOUS BLD VENIPUNCTURE: CPT

## 2019-12-04 PROCEDURE — 85730 THROMBOPLASTIN TIME PARTIAL: CPT

## 2019-12-04 PROCEDURE — 74177 CT ABD & PELVIS W/CONTRAST: CPT

## 2019-12-04 PROCEDURE — 80053 COMPREHEN METABOLIC PANEL: CPT

## 2019-12-04 RX ORDER — 0.9 % SODIUM CHLORIDE 0.9 %
1000 INTRAVENOUS SOLUTION INTRAVENOUS ONCE
Status: COMPLETED | OUTPATIENT
Start: 2019-12-04 | End: 2019-12-04

## 2019-12-04 RX ADMIN — IOPAMIDOL 75 ML: 755 INJECTION, SOLUTION INTRAVENOUS at 14:22

## 2019-12-04 RX ADMIN — SODIUM CHLORIDE 1000 ML: 9 INJECTION, SOLUTION INTRAVENOUS at 13:20

## 2019-12-04 ASSESSMENT — PAIN SCALES - GENERAL: PAINLEVEL_OUTOF10: 6

## 2019-12-04 ASSESSMENT — PAIN DESCRIPTION - PAIN TYPE: TYPE: ACUTE PAIN

## 2019-12-04 ASSESSMENT — PAIN DESCRIPTION - LOCATION: LOCATION: GENERALIZED;HEAD

## 2019-12-05 LAB
CULTURE: NORMAL
EKG ATRIAL RATE: 85 BPM
EKG P AXIS: 11 DEGREES
EKG P-R INTERVAL: 146 MS
EKG Q-T INTERVAL: 374 MS
EKG QRS DURATION: 100 MS
EKG QTC CALCULATION (BAZETT): 445 MS
EKG R AXIS: 54 DEGREES
EKG T AXIS: 4 DEGREES
EKG VENTRICULAR RATE: 85 BPM
Lab: NORMAL
SPECIMEN DESCRIPTION: NORMAL

## 2019-12-05 PROCEDURE — 93010 ELECTROCARDIOGRAM REPORT: CPT | Performed by: INTERNAL MEDICINE

## 2021-01-20 ENCOUNTER — HOSPITAL ENCOUNTER (OUTPATIENT)
Age: 31
Setting detail: SPECIMEN
Discharge: HOME | End: 2021-01-20
Payer: COMMERCIAL

## 2021-01-20 PROCEDURE — U0003 INFECTIOUS AGENT DETECTION BY NUCLEIC ACID (DNA OR RNA); SEVERE ACUTE RESPIRATORY SYNDROME CORONAVIRUS 2 (SARS-COV-2) (CORONAVIRUS DISEASE [COVID-19]), AMPLIFIED PROBE TECHNIQUE, MAKING USE OF HIGH THROUGHPUT TECHNOLOGIES AS DESCRIBED BY CMS-2020-01-R: HCPCS

## 2021-01-22 ENCOUNTER — APPOINTMENT (OUTPATIENT)
Dept: CT IMAGING | Age: 31
End: 2021-01-22
Payer: COMMERCIAL

## 2021-01-22 ENCOUNTER — HOSPITAL ENCOUNTER (EMERGENCY)
Age: 31
Discharge: HOME | End: 2021-01-22
Payer: COMMERCIAL

## 2021-01-22 ENCOUNTER — APPOINTMENT (OUTPATIENT)
Dept: GENERAL RADIOLOGY | Age: 31
End: 2021-01-22
Payer: COMMERCIAL

## 2021-01-22 VITALS
HEART RATE: 76 BPM | DIASTOLIC BLOOD PRESSURE: 65 MMHG | SYSTOLIC BLOOD PRESSURE: 117 MMHG | BODY MASS INDEX: 47.43 KG/M2 | RESPIRATION RATE: 20 BRPM | OXYGEN SATURATION: 95 % | TEMPERATURE: 97.5 F | HEIGHT: 77 IN

## 2021-01-22 DIAGNOSIS — R79.89 ELEVATED LFTS: ICD-10-CM

## 2021-01-22 DIAGNOSIS — Z20.822 PERSON UNDER INVESTIGATION FOR COVID-19: Primary | ICD-10-CM

## 2021-01-22 LAB
-: ABNORMAL
ABSOLUTE EOS #: 0.1 K/UL (ref 0–0.44)
ABSOLUTE IMMATURE GRANULOCYTE: <0.03 K/UL (ref 0–0.3)
ABSOLUTE LYMPH #: 1.9 K/UL (ref 1.1–3.7)
ABSOLUTE MONO #: 0.52 K/UL (ref 0.1–1.2)
ALBUMIN SERPL-MCNC: 4.1 G/DL (ref 3.5–5.2)
ALBUMIN/GLOBULIN RATIO: 1.1 (ref 1–2.5)
ALP BLD-CCNC: 111 U/L (ref 40–129)
ALT SERPL-CCNC: 472 U/L (ref 5–41)
AMORPHOUS: ABNORMAL
ANION GAP SERPL CALCULATED.3IONS-SCNC: 12 MMOL/L (ref 9–17)
AST SERPL-CCNC: 268 U/L
BACTERIA: ABNORMAL
BASOPHILS # BLD: 1 % (ref 0–2)
BASOPHILS ABSOLUTE: 0.05 K/UL (ref 0–0.2)
BILIRUB SERPL-MCNC: 2.11 MG/DL (ref 0.3–1.2)
BILIRUBIN URINE: ABNORMAL
BUN BLDV-MCNC: 12 MG/DL (ref 6–20)
BUN/CREAT BLD: 16 (ref 9–20)
CALCIUM SERPL-MCNC: 9.2 MG/DL (ref 8.6–10.4)
CASTS UA: ABNORMAL /LPF
CHLORIDE BLD-SCNC: 100 MMOL/L (ref 98–107)
CO2: 24 MMOL/L (ref 20–31)
COLOR: YELLOW
COMMENT UA: ABNORMAL
CREAT SERPL-MCNC: 0.74 MG/DL (ref 0.7–1.2)
CRYSTALS, UA: ABNORMAL /HPF
DIFFERENTIAL TYPE: ABNORMAL
EOSINOPHILS RELATIVE PERCENT: 2 % (ref 1–4)
EPITHELIAL CELLS UA: ABNORMAL /HPF (ref 0–5)
GFR AFRICAN AMERICAN: >60 ML/MIN
GFR NON-AFRICAN AMERICAN: >60 ML/MIN
GFR SERPL CREATININE-BSD FRML MDRD: ABNORMAL ML/MIN/{1.73_M2}
GFR SERPL CREATININE-BSD FRML MDRD: ABNORMAL ML/MIN/{1.73_M2}
GLUCOSE BLD-MCNC: 95 MG/DL (ref 70–99)
GLUCOSE URINE: NEGATIVE
HCT VFR BLD CALC: 51.1 % (ref 40.7–50.3)
HEMOGLOBIN: 17.3 G/DL (ref 13–17)
IMMATURE GRANULOCYTES: 0 %
KETONES, URINE: NEGATIVE
LEUKOCYTE ESTERASE, URINE: NEGATIVE
LIPASE: 37 U/L (ref 13–60)
LYMPHOCYTES # BLD: 33 % (ref 24–43)
MCH RBC QN AUTO: 29.4 PG (ref 25.2–33.5)
MCHC RBC AUTO-ENTMCNC: 33.9 G/DL (ref 28.4–34.8)
MCV RBC AUTO: 86.8 FL (ref 82.6–102.9)
MONOCYTES # BLD: 9 % (ref 3–12)
MUCUS: ABNORMAL
NITRITE, URINE: NEGATIVE
NRBC AUTOMATED: 0 PER 100 WBC
OTHER OBSERVATIONS UA: ABNORMAL
PDW BLD-RTO: 13.5 % (ref 11.8–14.4)
PH UA: 8 (ref 5–9)
PLATELET # BLD: 256 K/UL (ref 138–453)
PLATELET ESTIMATE: ABNORMAL
PMV BLD AUTO: 11.6 FL (ref 8.1–13.5)
POTASSIUM SERPL-SCNC: 3.5 MMOL/L (ref 3.7–5.3)
PROTEIN UA: NEGATIVE
RBC # BLD: 5.89 M/UL (ref 4.21–5.77)
RBC # BLD: ABNORMAL 10*6/UL
RBC UA: ABNORMAL /HPF (ref 0–2)
RENAL EPITHELIAL, UA: ABNORMAL /HPF
SARS-COV-2, NAA: NOT DETECTED
SARS-COV-2, RAPID: NOT DETECTED
SARS-COV-2: NORMAL
SARS-COV-2: NORMAL
SEG NEUTROPHILS: 55 % (ref 36–65)
SEGMENTED NEUTROPHILS ABSOLUTE COUNT: 3.21 K/UL (ref 1.5–8.1)
SODIUM BLD-SCNC: 136 MMOL/L (ref 135–144)
SOURCE: NORMAL
SPECIFIC GRAVITY UA: 1.01 (ref 1.01–1.02)
TOTAL PROTEIN: 7.8 G/DL (ref 6.4–8.3)
TRICHOMONAS: ABNORMAL
TURBIDITY: CLEAR
URINE HGB: NEGATIVE
UROBILINOGEN, URINE: ABNORMAL
WBC # BLD: 5.8 K/UL (ref 3.5–11.3)
WBC # BLD: ABNORMAL 10*3/UL
WBC UA: ABNORMAL /HPF (ref 0–5)
YEAST: ABNORMAL

## 2021-01-22 PROCEDURE — U0002 COVID-19 LAB TEST NON-CDC: HCPCS

## 2021-01-22 PROCEDURE — U0003 INFECTIOUS AGENT DETECTION BY NUCLEIC ACID (DNA OR RNA); SEVERE ACUTE RESPIRATORY SYNDROME CORONAVIRUS 2 (SARS-COV-2) (CORONAVIRUS DISEASE [COVID-19]), AMPLIFIED PROBE TECHNIQUE, MAKING USE OF HIGH THROUGHPUT TECHNOLOGIES AS DESCRIBED BY CMS-2020-01-R: HCPCS

## 2021-01-22 PROCEDURE — 74177 CT ABD & PELVIS W/CONTRAST: CPT

## 2021-01-22 PROCEDURE — C9803 HOPD COVID-19 SPEC COLLECT: HCPCS

## 2021-01-22 PROCEDURE — 83690 ASSAY OF LIPASE: CPT

## 2021-01-22 PROCEDURE — 85025 COMPLETE CBC W/AUTO DIFF WBC: CPT

## 2021-01-22 PROCEDURE — 71045 X-RAY EXAM CHEST 1 VIEW: CPT

## 2021-01-22 PROCEDURE — 80053 COMPREHEN METABOLIC PANEL: CPT

## 2021-01-22 PROCEDURE — 36415 COLL VENOUS BLD VENIPUNCTURE: CPT

## 2021-01-22 PROCEDURE — 81001 URINALYSIS AUTO W/SCOPE: CPT

## 2021-01-22 PROCEDURE — 99283 EMERGENCY DEPT VISIT LOW MDM: CPT

## 2021-01-22 PROCEDURE — 6360000004 HC RX CONTRAST MEDICATION: Performed by: PHYSICIAN ASSISTANT

## 2021-01-22 PROCEDURE — U0005 INFEC AGEN DETEC AMPLI PROBE: HCPCS

## 2021-01-22 PROCEDURE — 6370000000 HC RX 637 (ALT 250 FOR IP): Performed by: EMERGENCY MEDICINE

## 2021-01-22 RX ORDER — QUETIAPINE FUMARATE 50 MG/1
50 TABLET, FILM COATED ORAL 2 TIMES DAILY
COMMUNITY

## 2021-01-22 RX ORDER — DOXYCYCLINE HYCLATE 100 MG/1
100 CAPSULE ORAL ONCE
Status: COMPLETED | OUTPATIENT
Start: 2021-01-22 | End: 2021-01-22

## 2021-01-22 RX ORDER — DOXYCYCLINE 100 MG/1
100 TABLET ORAL 2 TIMES DAILY
Qty: 20 TABLET | Refills: 0 | Status: SHIPPED | OUTPATIENT
Start: 2021-01-22 | End: 2021-02-01

## 2021-01-22 RX ADMIN — IOPAMIDOL 75 ML: 755 INJECTION, SOLUTION INTRAVENOUS at 19:42

## 2021-01-22 RX ADMIN — DOXYCYCLINE HYCLATE 100 MG: 100 CAPSULE ORAL at 20:41

## 2021-01-22 ASSESSMENT — ENCOUNTER SYMPTOMS
DIARRHEA: 0
CHEST TIGHTNESS: 0
SHORTNESS OF BREATH: 0
COUGH: 0
BACK PAIN: 0
BLOOD IN STOOL: 0
EYE REDNESS: 0
WHEEZING: 0
VOMITING: 0
SORE THROAT: 0
ABDOMINAL PAIN: 1
CONSTIPATION: 0
EYE DISCHARGE: 0
NAUSEA: 0
RHINORRHEA: 0

## 2021-01-23 ENCOUNTER — CARE COORDINATION (OUTPATIENT)
Dept: CARE COORDINATION | Age: 31
End: 2021-01-23

## 2021-01-23 NOTE — ED PROVIDER NOTES
[facility] Emergency Department ED  Emergency Department  Emergency Medicine Sign-out     Care of Kaitlin Mosley was assumed from [facility] and is being seen for Other (Pt sent from skilled nursing for low blood pressure reading) and Fatigue (Onset 1-2 weeks ago)  . The patient's initial evaluation and plan have been discussed with the prior provider who initially evaluated the patient.      EMERGENCY DEPARTMENT COURSE / MEDICAL DECISION MAKING:       MEDICATIONS GIVEN:  Orders Placed This Encounter   Medications    iopamidol (ISOVUE-370) 76 % injection 75 mL    doxycycline hyclate (VIBRAMYCIN) capsule 100 mg     Order Specific Question:   Antimicrobial Indications     Answer:   Pneumonia (HAP)    doxycycline monohydrate (ADOXA) 100 MG tablet     Sig: Take 1 tablet by mouth 2 times daily for 10 days     Dispense:  20 tablet     Refill:  0       LABS / RADIOLOGY:     Labs Reviewed   CBC WITH AUTO DIFFERENTIAL - Abnormal; Notable for the following components:       Result Value    RBC 5.89 (*)     Hemoglobin 17.3 (*)     Hematocrit 51.1 (*)     All other components within normal limits   COMPREHENSIVE METABOLIC PANEL - Abnormal; Notable for the following components:    Potassium 3.5 (*)      (*)      (*)     Total Bilirubin 2.11 (*)     All other components within normal limits   URINE RT REFLEX TO CULTURE - Abnormal; Notable for the following components:    Bilirubin Urine SMALL (*)     Urobilinogen, Urine ELEVATED (*)     All other components within normal limits   MICROSCOPIC URINALYSIS - Abnormal; Notable for the following components:    Mucus, UA TRACE (*)     Amorphous, UA 3+ (*)     All other components within normal limits   COVID-19   LIPASE   COVID-19       Ct Abdomen Pelvis W Iv Contrast Additional Contrast? None    Result Date: 1/22/2021  EXAMINATION: CT OF THE ABDOMEN AND PELVIS WITH CONTRAST 1/22/2021 6:40 pm TECHNIQUE: CT of the abdomen and pelvis was performed with the administration of intravenous contrast. Multiplanar reformatted images are provided for review. Dose modulation, iterative reconstruction, and/or weight based adjustment of the mA/kV was utilized to reduce the radiation dose to as low as reasonably achievable. COMPARISON: 12/04/2019 HISTORY: ORDERING SYSTEM PROVIDED HISTORY: abdominal pain, nausea, elevated bilirubin TECHNOLOGIST PROVIDED HISTORY: abdominal pain, nausea, elevated bilirubin FINDINGS: Lower Chest:  Visualized portion of the lower chest demonstrates no acute abnormality. Organs: Mild diffuse liver steatosis. Mild chronic splenomegaly measuring 17 cm. No calcified gallstones. No intrahepatic or common bile duct dilation. Pancreas is normal.  Adrenal glands and kidneys are normal. GI/Bowel: Normal appendix. No dilated or inflamed loops of bowel. Pelvis: No pelvic mass, adenopathy, or fluid collection. Peritoneum/Retroperitoneum: Portacaval lymph nodes are stable from 2019 measuring up to 34 x 20 mm adjacent peripancreatic 20 mm node is stable. No abdominal aortic aneurysm. No adenopathy. No ascites. No free intraperitoneal air. Bones/Soft Tissues: No acute osseous abnormality. No acute abnormality in the abdomen or pelvis. No biliary dilation. No calcified gallstones. Xr Chest Portable    Result Date: 1/22/2021  EXAMINATION: ONE XRAY VIEW OF THE CHEST 1/22/2021 2:58 pm COMPARISON: None. HISTORY: ORDERING SYSTEM PROVIDED HISTORY: covid TECHNOLOGIST PROVIDED HISTORY: covid FINDINGS: Hypoventilated chest with some faint strandy medial basilar densities. No focal consolidation, pneumothorax, or pleural effusion. Cardiomediastinal contours and bony thorax are grossly unremarkable. Hypoventilated chest with some strandy medial bibasilar densities which may be sequelae of atelectasis or possibly early infection. This pattern is indeterminate for COVID-19 pneumonia.        RECENT VITALS:     Temp: 97.5 °F (36.4 °C),  Pulse: 76, Resp: 20, BP: 117/65, SpO2: 95 %    This patient is a 27 y.o. Male with abdominal pain. Patient CT abdomen pelvis was pending at the time of signout. I reviewed the CT abdomen pelvis and it was unremarkable. Patient LFTs are slightly elevated however they are significantly better than previously. He has a history of hepatitis C most likely his LFT abnormality is secondary to his hepatitis. Does not need emergent hospitalization but does need follow-up which I explained to patient and he understands. No significant jaundice otherwise. Vitals are completely normal, normal blood pressures, no tachycardia, afebrile, comfortable appearing patient. Chest x-ray showed concern for atypical pneumonia versus viral pneumonia. His Covid was negative so this may just be an atypical bacterial pneumonia which is why doxycycline was started. Follow-up with PCP, normal vitals, return to ER worsening symptoms. OUTSTANDING TASKS / RECOMMENDATIONS:    1. F/U CT abd pelvis     FINAL IMPRESSION:     1. Person under investigation for COVID-19    2.  Elevated LFTs        DISPOSITION:         DISPOSITION:  [x]  Discharge   []  Transfer -    []  Admission -     []  Against Medical Advice   []  [address]: [address] [place] [address].  [address]  [address] [place] [address]  [phone]  Call in 2 days       DISCHARGE MEDICATIONS: Discharge Medication List as of 1/22/2021  8:32 PM      START taking these medications    Details   doxycycline monohydrate (ADOXA) 100 MG tablet Take 1 tablet by mouth 2 times daily for 10 days, Disp-20 tablet, R-0Print                 Vandana Garvey MD  Emergency Medicine Attending       Vandana Garvey MD  01/22/21 2840

## 2021-01-23 NOTE — CARE COORDINATION
Matthew Webber was seen [facility] 1/22/2021- Concern for Covid. His Covid test was negative. He is incarcerated. Dx: Atypical Pneumonia- given Doxy and returned to nursing home. Unable to reach Patient by phone. Episode resolved.

## 2021-01-23 NOTE — ED PROVIDER NOTES
[facility] ED  eMERGENCY dEPARTMENT eNCOUnter      Pt Name: Ranulfo Delgado  MRN: [id number]  [place] 1990  Date of evaluation: 1/22/2021  Provider: Shannen Peters Dr     Chief Complaint   Patient presents with    Other     Pt sent from shelter for low blood pressure reading    Fatigue     Onset 1-2 weeks ago         HISTORY OF PRESENT ILLNESS   (Location/Symptom, Timing/Onset, Context/Setting,Quality, Duration, Modifying Factors, Severity)  Note limiting factors. Ranulfo Delgado is a31 y.o. male who presents to the emergency department with complains of generalized fatigue for about 10 days. Associated complaints include abdominal discomfort with decreased appetite and nausea. Patient reports a history of hepatitis. He states he is currently in shelter and around others who could possibly have Covid. He reports he took a test a couple of days ago but the test is not back yet. He denies any chest pain or shortness of breath. Denies any headache or dizziness. He has no other complaints this time. HPI    Nursing Notes werereviewed. REVIEW OF SYSTEMS    (2-9 systems for level 4, 10 or more for level 5)     Review of Systems   Constitutional: Positive for fatigue. Negative for chills, diaphoresis and fever. HENT: Negative for congestion, ear pain, rhinorrhea and sore throat. Eyes: Negative for discharge, redness and visual disturbance. Respiratory: Negative for cough, chest tightness, shortness of breath and wheezing. Cardiovascular: Negative for chest pain and palpitations. Gastrointestinal: Positive for abdominal pain. Negative for blood in stool, constipation, diarrhea, nausea and vomiting. Endocrine: Negative for polydipsia, polyphagia and polyuria. Genitourinary: Negative for decreased urine volume, difficulty urinating, dysuria, frequency and hematuria. Musculoskeletal: Positive for arthralgias. Negative for back pain and myalgias. Activity    Alcohol use: No    Drug use: Not Currently     Types: Opiates      Comment: history of heroin abuse    Sexual activity: Not Currently   Lifestyle    Physical activity     Days per week: None     Minutes per session: None    Stress: None   Relationships    Social connections     Talks on phone: None     Gets together: None     Attends Muslim service: None     Active member of club or organization: None     Attends meetings of clubs or organizations: None     Relationship status: None    Intimate partner violence     Fear of current or ex partner: None     Emotionally abused: None     Physically abused: None     Forced sexual activity: None   Other Topics Concern    None   Social History Narrative    None       SCREENINGS    Rajinder Coma Scale  Eye Opening: Spontaneous  Best Verbal Response: Oriented  Best Motor Response: Obeys commands  [place] Coma Scale Score: 15 @FLOW([id number])@      PHYSICAL EXAM    (up to 7 for level 4, 8 or more for level 5)     ED Triage Vitals   BP Temp Temp Source Pulse Resp SpO2 Height Weight   01/22/21 1707 01/22/21 1710 01/22/21 1710 01/22/21 1710 01/22/21 1710 01/22/21 1707 01/22/21 1710 --   (!) 180/113 97.5 °F (36.4 °C) Oral 74 16 100 % 6' 5\" (1.956 m)        Physical Exam  Vitals signs and nursing note reviewed. Constitutional:       General: He is not in acute distress. Appearance: Normal appearance. He is well-developed. He is not ill-appearing, toxic-appearing or diaphoretic. HENT:      Head: Normocephalic and atraumatic. Right Ear: External ear normal.      Left Ear: External ear normal.      Mouth/Throat:      Mouth: Mucous membranes are moist.      Pharynx: No oropharyngeal exudate. Eyes:      General: No scleral icterus. Right eye: No discharge. Left eye: No discharge. Conjunctiva/sclera: Conjunctivae normal.      Pupils: Pupils are equal, round, and reactive to light.    Neck:      Musculoskeletal: Normal range of motion and neck supple. Thyroid: No thyromegaly. Trachea: No tracheal deviation. Cardiovascular:      Rate and Rhythm: Normal rate and regular rhythm. Heart sounds: No murmur. No friction rub. No gallop. Pulmonary:      Effort: Pulmonary effort is normal. No respiratory distress. Breath sounds: Normal breath sounds. No stridor. No wheezing. Abdominal:      General: Bowel sounds are normal. There is no distension. Palpations: Abdomen is soft. Tenderness: There is abdominal tenderness. There is no guarding or rebound. Musculoskeletal: Normal range of motion. General: No tenderness or deformity. Lymphadenopathy:      Cervical: No cervical adenopathy. Skin:     General: Skin is warm and dry. Capillary Refill: Capillary refill takes less than 2 seconds. Findings: No erythema or rash. Neurological:      General: No focal deficit present. Mental Status: He is alert and oriented to person, place, and time. Cranial Nerves: No cranial nerve deficit. Motor: No abnormal muscle tone. Deep Tendon Reflexes: Reflexes are normal and symmetric. Reflexes normal.   Psychiatric:         Behavior: Behavior normal.         DIAGNOSTIC RESULTS     EKG: All EKG's are interpreted by the Emergency Department Physician who either signs orCo-signs this chart in the absence of a cardiologist.      RADIOLOGY:   Non-plainfilm images such as CT, Ultrasound and MRI are read by the radiologist. Plain radiographic images are visualized and preliminarily interpreted by the emergency physician with the below findings:      Interpretationper the Radiologist below, if available at the time of this note:    CT ABDOMEN PELVIS W IV CONTRAST Additional Contrast? None   Final Result   No acute abnormality in the abdomen or pelvis. No biliary dilation. No   calcified gallstones.          XR CHEST PORTABLE   Final Result   Hypoventilated chest with some strandy medial bibasilar he was here. He will need continued follow-up with this. Initially Covid negative but chest x-ray is concerning for Covid. We will send him to [facility]. Transferring care this patient my attending physician Dr. Sushil Marrero. Procedures    FINAL IMPRESSION      1. Person under investigation for COVID-19    2. Elevated LFTs        DISPOSITION/PLAN   DISPOSITION Decision To Discharge 01/22/2021 08:30:22 PM      PATIENT REFERRED TO:  [facility]  [address]  [address]  [phone]  Call in 2 days        DISCHARGE MEDICATIONS:  Discharge Medication List as of 1/22/2021  8:32 PM      START taking these medications    Details   doxycycline monohydrate (ADOXA) 100 MG tablet Take 1 tablet by mouth 2 times daily for 10 days, Disp-20 tablet, R-0Print                    Summation      Patient Course:      ED Medications administered this visit:    Medications   iopamidol (ISOVUE-370) 76 % injection 75 mL (75 mLs Intravenous Given 1/22/21 1942)   doxycycline hyclate (VIBRAMYCIN) capsule 100 mg (100 mg Oral Given 1/22/21 2041)       New Prescriptions from this visit:    Discharge Medication List as of 1/22/2021  8:32 PM      START taking these medications    Details   doxycycline monohydrate (ADOXA) 100 MG tablet Take 1 tablet by mouth 2 times daily for 10 days, Disp-20 tablet, R-0Print             Follow-up:  [facility] [place]  [address]  [address]  [phone]  Call in 2 days          Final Impression:   1. Person under investigation for COVID-19    2.  Elevated LFTs               (Please note that portions of this note were completed with a voice recognition program.  Efforts were made to edit the dictations but occasionally words are mis-transcribed.)           Karis Mccain PA-C  01/22/21 1147

## 2021-01-24 LAB
SARS-COV-2, RAPID: NORMAL
SARS-COV-2: NORMAL
SARS-COV-2: NOT DETECTED
SOURCE: NORMAL

## 2021-04-10 ENCOUNTER — HOSPITAL ENCOUNTER (EMERGENCY)
Age: 31
Discharge: HOME | End: 2021-04-10
Payer: COMMERCIAL

## 2021-04-10 ENCOUNTER — APPOINTMENT (OUTPATIENT)
Dept: GENERAL RADIOLOGY | Age: 31
End: 2021-04-10
Payer: COMMERCIAL

## 2021-04-10 VITALS
HEART RATE: 98 BPM | OXYGEN SATURATION: 98 % | WEIGHT: 315 LBS | SYSTOLIC BLOOD PRESSURE: 134 MMHG | DIASTOLIC BLOOD PRESSURE: 63 MMHG | TEMPERATURE: 98 F | BODY MASS INDEX: 42.69 KG/M2 | RESPIRATION RATE: 14 BRPM

## 2021-04-10 DIAGNOSIS — E87.6 HYPOKALEMIA: ICD-10-CM

## 2021-04-10 DIAGNOSIS — L03.115 CELLULITIS OF RIGHT LOWER EXTREMITY: Primary | ICD-10-CM

## 2021-04-10 LAB
ABSOLUTE EOS #: 0.34 K/UL (ref 0–0.44)
ABSOLUTE IMMATURE GRANULOCYTE: <0.03 K/UL (ref 0–0.3)
ABSOLUTE LYMPH #: 1.58 K/UL (ref 1.1–3.7)
ABSOLUTE MONO #: 0.68 K/UL (ref 0.1–1.2)
ANION GAP SERPL CALCULATED.3IONS-SCNC: 9 MMOL/L (ref 9–17)
BASOPHILS # BLD: 1 % (ref 0–2)
BASOPHILS ABSOLUTE: 0.03 K/UL (ref 0–0.2)
BUN BLDV-MCNC: 8 MG/DL (ref 6–20)
BUN/CREAT BLD: 14 (ref 9–20)
CALCIUM SERPL-MCNC: 7.8 MG/DL (ref 8.6–10.4)
CHLORIDE BLD-SCNC: 100 MMOL/L (ref 98–107)
CO2: 29 MMOL/L (ref 20–31)
CREAT SERPL-MCNC: 0.58 MG/DL (ref 0.7–1.2)
DIFFERENTIAL TYPE: ABNORMAL
EOSINOPHILS RELATIVE PERCENT: 5 % (ref 1–4)
GFR AFRICAN AMERICAN: >60 ML/MIN
GFR NON-AFRICAN AMERICAN: >60 ML/MIN
GFR SERPL CREATININE-BSD FRML MDRD: ABNORMAL ML/MIN/{1.73_M2}
GFR SERPL CREATININE-BSD FRML MDRD: ABNORMAL ML/MIN/{1.73_M2}
GLUCOSE BLD-MCNC: 165 MG/DL (ref 70–99)
HCT VFR BLD CALC: 37.2 % (ref 40.7–50.3)
HEMOGLOBIN: 12.7 G/DL (ref 13–17)
IMMATURE GRANULOCYTES: 0 %
LACTIC ACID, WHOLE BLOOD: ABNORMAL MMOL/L (ref 0.7–2.1)
LACTIC ACID, WHOLE BLOOD: NORMAL MMOL/L (ref 0.7–2.1)
LACTIC ACID: 1.2 MMOL/L (ref 0.5–2.2)
LACTIC ACID: 2.4 MMOL/L (ref 0.5–2.2)
LYMPHOCYTES # BLD: 25 % (ref 24–43)
MAGNESIUM: 2 MG/DL (ref 1.6–2.6)
MCH RBC QN AUTO: 31.4 PG (ref 25.2–33.5)
MCHC RBC AUTO-ENTMCNC: 34.1 G/DL (ref 28.4–34.8)
MCV RBC AUTO: 92.1 FL (ref 82.6–102.9)
MONOCYTES # BLD: 11 % (ref 3–12)
NRBC AUTOMATED: 0 PER 100 WBC
PDW BLD-RTO: 14.6 % (ref 11.8–14.4)
PLATELET # BLD: 146 K/UL (ref 138–453)
PLATELET ESTIMATE: ABNORMAL
PMV BLD AUTO: 10.9 FL (ref 8.1–13.5)
POTASSIUM SERPL-SCNC: 2.6 MMOL/L (ref 3.7–5.3)
RBC # BLD: 4.04 M/UL (ref 4.21–5.77)
RBC # BLD: ABNORMAL 10*6/UL
SEDIMENTATION RATE, ERYTHROCYTE: 16 MM (ref 0–20)
SEG NEUTROPHILS: 58 % (ref 36–65)
SEGMENTED NEUTROPHILS ABSOLUTE COUNT: 3.61 K/UL (ref 1.5–8.1)
SODIUM BLD-SCNC: 138 MMOL/L (ref 135–144)
WBC # BLD: 6.3 K/UL (ref 3.5–11.3)
WBC # BLD: ABNORMAL 10*3/UL

## 2021-04-10 PROCEDURE — 85025 COMPLETE CBC W/AUTO DIFF WBC: CPT

## 2021-04-10 PROCEDURE — 99284 EMERGENCY DEPT VISIT MOD MDM: CPT

## 2021-04-10 PROCEDURE — 85652 RBC SED RATE AUTOMATED: CPT

## 2021-04-10 PROCEDURE — 93005 ELECTROCARDIOGRAM TRACING: CPT | Performed by: PHYSICIAN ASSISTANT

## 2021-04-10 PROCEDURE — 73590 X-RAY EXAM OF LOWER LEG: CPT

## 2021-04-10 PROCEDURE — 6370000000 HC RX 637 (ALT 250 FOR IP): Performed by: PHYSICIAN ASSISTANT

## 2021-04-10 PROCEDURE — 36415 COLL VENOUS BLD VENIPUNCTURE: CPT

## 2021-04-10 PROCEDURE — 83605 ASSAY OF LACTIC ACID: CPT

## 2021-04-10 PROCEDURE — 96374 THER/PROPH/DIAG INJ IV PUSH: CPT

## 2021-04-10 PROCEDURE — 83735 ASSAY OF MAGNESIUM: CPT

## 2021-04-10 PROCEDURE — 80048 BASIC METABOLIC PNL TOTAL CA: CPT

## 2021-04-10 PROCEDURE — 2580000003 HC RX 258: Performed by: PHYSICIAN ASSISTANT

## 2021-04-10 PROCEDURE — 6360000002 HC RX W HCPCS: Performed by: PHYSICIAN ASSISTANT

## 2021-04-10 RX ORDER — SULFAMETHOXAZOLE AND TRIMETHOPRIM 800; 160 MG/1; MG/1
1 TABLET ORAL 2 TIMES DAILY
Qty: 14 TABLET | Refills: 0 | Status: SHIPPED | OUTPATIENT
Start: 2021-04-10 | End: 2021-04-17

## 2021-04-10 RX ORDER — 0.9 % SODIUM CHLORIDE 0.9 %
1000 INTRAVENOUS SOLUTION INTRAVENOUS ONCE
Status: COMPLETED | OUTPATIENT
Start: 2021-04-10 | End: 2021-04-10

## 2021-04-10 RX ORDER — CEPHALEXIN 500 MG/1
500 CAPSULE ORAL 4 TIMES DAILY
Qty: 28 CAPSULE | Refills: 0 | Status: SHIPPED | OUTPATIENT
Start: 2021-04-10 | End: 2021-04-17

## 2021-04-10 RX ORDER — POTASSIUM CHLORIDE 20 MEQ/1
40 TABLET, EXTENDED RELEASE ORAL ONCE
Status: COMPLETED | OUTPATIENT
Start: 2021-04-10 | End: 2021-04-10

## 2021-04-10 RX ORDER — POTASSIUM CHLORIDE 7.45 MG/ML
10 INJECTION INTRAVENOUS
Status: DISPENSED | OUTPATIENT
Start: 2021-04-10 | End: 2021-04-10

## 2021-04-10 RX ORDER — SULFAMETHOXAZOLE AND TRIMETHOPRIM 800; 160 MG/1; MG/1
1 TABLET ORAL ONCE
Status: COMPLETED | OUTPATIENT
Start: 2021-04-10 | End: 2021-04-10

## 2021-04-10 RX ADMIN — SULFAMETHOXAZOLE AND TRIMETHOPRIM 1 TABLET: 800; 160 TABLET ORAL at 15:04

## 2021-04-10 RX ADMIN — POTASSIUM CHLORIDE 40 MEQ: 1500 TABLET, EXTENDED RELEASE ORAL at 15:04

## 2021-04-10 RX ADMIN — SODIUM CHLORIDE 1000 ML: 9 INJECTION, SOLUTION INTRAVENOUS at 15:05

## 2021-04-10 RX ADMIN — POTASSIUM CHLORIDE 10 MEQ: 7.46 INJECTION, SOLUTION INTRAVENOUS at 15:05

## 2021-04-10 RX ADMIN — WATER 1000 MG: 1 INJECTION INTRAMUSCULAR; INTRAVENOUS; SUBCUTANEOUS at 15:04

## 2021-04-10 ASSESSMENT — ENCOUNTER SYMPTOMS
EYE DISCHARGE: 0
BLOOD IN STOOL: 0
EYE REDNESS: 0
RHINORRHEA: 0
SORE THROAT: 0
BACK PAIN: 0
CHEST TIGHTNESS: 0
COUGH: 0
VOMITING: 0
DIARRHEA: 0
NAUSEA: 0
SHORTNESS OF BREATH: 0
ABDOMINAL PAIN: 0
CONSTIPATION: 0
WHEEZING: 0

## 2021-04-10 NOTE — ED PROVIDER NOTES
[address] [facility] ED  EMERGENCY DEPARTMENT ENCOUNTER      Pt Name: Raymonde Curling  MRN: [id number]  [place] 1990  Date of evaluation: 4/10/2021  Provider: Shannen Cueva Dr     Chief Complaint   Patient presents with    Wound Check     pt states he has a draining wound to his right lower leg, no known injury         HISTORY OF PRESENT ILLNESS   (Location/Symptom, Timing/Onset, Context/Setting,Quality, Duration, Modifying Factors, Severity)  Note limiting factors. Raymonde Curling is a31 y.o. male who presents to the emergency department for complaints of wound to his right lower leg. Patient reports that he tends to hold it up against something with broke off a scab. He states that he has had cellulitis before and is concerned that he has cellulitis again. 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 He does also have a history of hepatitis C. He denies any numbness or tingling sensation please able to get up and walk around on his leg. He has no other complaints at this time. HPI    Nursing Notes werereviewed. REVIEW OF SYSTEMS    (2-9 systems for level 4, 10 or more for level 5)     Review of Systems   Constitutional: Negative for chills, diaphoresis and fever. HENT: Negative for congestion, ear pain, rhinorrhea and sore throat. Eyes: Negative for discharge, redness and visual disturbance. Respiratory: Negative for cough, chest tightness, shortness of breath and wheezing. Cardiovascular: Negative for chest pain and palpitations. Gastrointestinal: Negative for abdominal pain, blood in stool, constipation, diarrhea, nausea and vomiting. Endocrine: Negative for polydipsia, polyphagia and polyuria. Genitourinary: Negative for decreased urine volume, difficulty urinating, dysuria, frequency and hematuria. Musculoskeletal: Negative for arthralgias, back pain and myalgias. Skin: Positive for wound.  Negative for pallor and rash. Allergic/Immunologic: Negative for food allergies and immunocompromised state. Neurological: Negative for dizziness, syncope, weakness and light-headedness. Hematological: Negative for adenopathy. Does not bruise/bleed easily. Psychiatric/Behavioral: Negative for behavioral problems and suicidal ideas. The patient is not nervous/anxious. Except as noted above the remainder of the review of systems was reviewed and negative.        PAST MEDICAL HISTORY     Past Medical History:   Diagnosis Date    Hepatitis C     Hepatitis C 2019    Heroin abuse ([facility]     HTN (hypertension) 12/10/2012    [facility] 12/10/2012         SURGICALHISTORY       Past Surgical History:   Procedure Laterality Date    WISDOM TOOTH EXTRACTION      age 25         CURRENT MEDICATIONS       Discharge Medication List as of 4/10/2021  4:43 PM      CONTINUE these medications which have NOT CHANGED    Details   QUEtiapine (SEROQUEL) 50 MG tablet Take 50 mg by mouth 2 times dailyHistorical Med               ALLERGIES   Tramadol and Zithromax [azithromycin]    FAMILY HISTORY       Family History   Problem Relation Age of Onset    Diabetes Father     Diabetes Maternal Grandfather     Diabetes Paternal Grandmother     Diabetes Paternal Grandfather           SOCIAL HISTORY       Social History     Socioeconomic History    Marital status: Single     Spouse name: None    Number of children: None    Years of education: None    Highest education level: None   Occupational History    None   Social Needs    Financial resource strain: None    Food insecurity     Worry: None     Inability: None    Transportation needs     Medical: None     Non-medical: None   Tobacco Use    Smoking status: Current Every Day Smoker     Packs/day: 0.50     Types: Cigarettes     Last attempt to quit: 2012     Years since quittin.3    Smokeless tobacco: Never Used   Substance and Sexual Activity    Alcohol use: No    Drug use: Not Currently     Types: Opiates      Comment: history of heroin abuse    Sexual activity: Not Currently   Lifestyle    Physical activity     Days per week: None     Minutes per session: None    Stress: None   Relationships    Social connections     Talks on phone: None     Gets together: None     Attends Holiness service: None     Active member of club or organization: None     Attends meetings of clubs or organizations: None     Relationship status: None    Intimate partner violence     Fear of current or ex partner: None     Emotionally abused: None     Physically abused: None     Forced sexual activity: None   Other Topics Concern    None   Social History Narrative    None       SCREENINGS             PHYSICAL EXAM    (up to 7 for level 4, 8 or more for level 5)     ED Triage Vitals [04/10/21 1244]   BP Temp Temp Source Pulse Resp SpO2 Height Weight   (!) 167/119 98 °F (36.7 °C) Tympanic 108 22 98 % -- (!) 360 lb (163.3 kg)       Physical Exam  Vitals signs and nursing note reviewed. Constitutional:       General: He is not in acute distress. Appearance: Normal appearance. He is well-developed. He is not ill-appearing, toxic-appearing or diaphoretic. HENT:      Head: Normocephalic and atraumatic. Right Ear: External ear normal.      Left Ear: External ear normal.      Mouth/Throat:      Mouth: Mucous membranes are moist.      Pharynx: No oropharyngeal exudate. Eyes:      General: No scleral icterus. Right eye: No discharge. Left eye: No discharge. Conjunctiva/sclera: Conjunctivae normal.      Pupils: Pupils are equal, round, and reactive to light. Neck:      Musculoskeletal: Normal range of motion and neck supple. Thyroid: No thyromegaly. Trachea: No tracheal deviation. Cardiovascular:      Rate and Rhythm: Normal rate and regular rhythm. Heart sounds: No murmur. No friction rub. No gallop.     Pulmonary:      Effort: Pulmonary effort is normal. No respiratory distress. Breath sounds: Normal breath sounds. No stridor. No wheezing or rhonchi. Abdominal:      General: Bowel sounds are normal. There is no distension. Palpations: Abdomen is soft. Tenderness: There is no abdominal tenderness. There is no guarding or rebound. Musculoskeletal: Normal range of motion. General: No tenderness or deformity. Lymphadenopathy:      Cervical: No cervical adenopathy. Skin:     General: Skin is warm and dry. Capillary Refill: Capillary refill takes less than 2 seconds. Findings: Erythema present. No rash. Neurological:      General: No focal deficit present. Mental Status: He is alert and oriented to person, place, and time. Cranial Nerves: No cranial nerve deficit. Motor: No abnormal muscle tone. Deep Tendon Reflexes: Reflexes are normal and symmetric. Reflexes normal.   Psychiatric:         Behavior: Behavior normal.             DIAGNOSTIC RESULTS     EKG: All EKG's are interpreted by the Emergency Department Physician who either signs orCo-signs this chart in the absence of a cardiologist.      RADIOLOGY:   Non-plainfilm images such as CT, Ultrasound and MRI are read by the radiologist. Plain radiographic images are visualized and preliminarily interpreted by the emergency physician with the below findings:      Interpretationper the Radiologist below, if available at the time of this note:    XR TIBIA FIBULA RIGHT (2 VIEWS)   Final Result   Soft tissue swelling. No acute osseous abnormality or radiopaque foreign   body noted.                ED BEDSIDE ULTRASOUND:   Performed by ED Physician - none    LABS:  Labs Reviewed   CBC WITH AUTO DIFFERENTIAL - Abnormal; Notable for the following components:       Result Value    RBC 4.04 (*)     Hemoglobin 12.7 (*)     Hematocrit 37.2 (*)     RDW 14.6 (*)     Eosinophils % 5 (*)     All other components within normal limits   BASIC METABOLIC PANEL - open wound to the anterior lower leg. He will otherwise return the ER with any new or worse complaints. He can get into primary care he will return to the ER to have a recheck. I also discussed that he needs to take a couple of times he has a low calcium and he will need to be evaluated by primary care as well. Procedures    FINAL IMPRESSION      1. Cellulitis of right lower extremity    2.  Hypokalemia        DISPOSITION/PLAN   DISPOSITION        PATIENT REFERRED TO:  [facility] ED  [address]  [phone]    If symptoms worsen, As needed    [address]  [phone]          DISCHARGE MEDICATIONS:  Discharge Medication List as of 4/10/2021  4:43 PM      START taking these medications    Details   cephALEXin (KEFLEX) 500 MG capsule Take 1 capsule by mouth 4 times daily for 7 days, Disp-28 capsule, R-0Normal      sulfamethoxazole-trimethoprim (BACTRIM DS) 800-160 MG per tablet Take 1 tablet by mouth 2 times daily for 7 days, Disp-14 tablet, R-0Normal                    Summation      Patient Course:      ED Medications administered this visit:    Medications   potassium chloride 10 mEq/100 mL IVPB (Peripheral Line) (0 mEq Intravenous Stopped 4/10/21 1709)   0.9 % sodium chloride bolus (0 mLs Intravenous Stopped 4/10/21 1708)   potassium chloride (KLOR-CON M) extended release tablet 40 mEq (40 mEq Oral Given 4/10/21 1504)   cefTRIAXone (ROCEPHIN) 1,000 mg in sterile water 10 mL IV syringe (0 mg Intravenous Stopped 4/10/21 1507)   sulfamethoxazole-trimethoprim (BACTRIM DS;SEPTRA DS) 800-160 MG per tablet 1 tablet (1 tablet Oral Given 4/10/21 1504)       New Prescriptions from this visit:    Discharge Medication List as of 4/10/2021  4:43 PM      START taking these medications    Details   cephALEXin (KEFLEX) 500 MG capsule Take 1 capsule by mouth 4 times daily for 7 days, Disp-28 capsule, R-0Normal sulfamethoxazole-trimethoprim (BACTRIM DS) 800-160 MG per tablet Take 1 tablet by mouth 2 times daily for 7 days, Disp-14 tablet, R-0Normal             Follow-up:  [facility] ED  [address] [facility]  [phone]    If symptoms worsen, As needed    [address]  [phone]            Final Impression:   1. Cellulitis of right lower extremity    2.  Hypokalemia               (Please note that portions of this note were completed with a voice recognition program.  Efforts were made to edit the dictations but occasionally words are mis-transcribed.)           Dave Marin PA-C  04/11/21 [place], [place]  04/11/21 7306

## 2021-04-11 ENCOUNTER — HOSPITAL ENCOUNTER (OUTPATIENT)
Age: 31
Discharge: HOME | End: 2021-04-11
Payer: COMMERCIAL

## 2021-04-11 DIAGNOSIS — E87.6 HYPOKALEMIA: ICD-10-CM

## 2021-04-11 LAB
ANION GAP SERPL CALCULATED.3IONS-SCNC: 9 MMOL/L (ref 9–17)
BUN BLDV-MCNC: 7 MG/DL (ref 6–20)
BUN/CREAT BLD: 15 (ref 9–20)
CALCIUM SERPL-MCNC: 7.8 MG/DL (ref 8.6–10.4)
CHLORIDE BLD-SCNC: 96 MMOL/L (ref 98–107)
CO2: 28 MMOL/L (ref 20–31)
CREAT SERPL-MCNC: 0.46 MG/DL (ref 0.7–1.2)
EKG ATRIAL RATE: 100 BPM
EKG P AXIS: 26 DEGREES
EKG P-R INTERVAL: 160 MS
EKG Q-T INTERVAL: 414 MS
EKG QRS DURATION: 102 MS
EKG QTC CALCULATION (BAZETT): 534 MS
EKG R AXIS: 45 DEGREES
EKG T AXIS: 45 DEGREES
EKG VENTRICULAR RATE: 100 BPM
GFR AFRICAN AMERICAN: >60 ML/MIN
GFR NON-AFRICAN AMERICAN: >60 ML/MIN
GFR SERPL CREATININE-BSD FRML MDRD: ABNORMAL ML/MIN/{1.73_M2}
GFR SERPL CREATININE-BSD FRML MDRD: ABNORMAL ML/MIN/{1.73_M2}
GLUCOSE BLD-MCNC: 121 MG/DL (ref 70–99)
POTASSIUM SERPL-SCNC: 3.6 MMOL/L (ref 3.7–5.3)
SODIUM BLD-SCNC: 133 MMOL/L (ref 135–144)

## 2021-04-11 PROCEDURE — 93010 ELECTROCARDIOGRAM REPORT: CPT | Performed by: INTERNAL MEDICINE

## 2021-04-11 PROCEDURE — 80048 BASIC METABOLIC PNL TOTAL CA: CPT

## 2021-04-11 PROCEDURE — 36415 COLL VENOUS BLD VENIPUNCTURE: CPT

## 2021-05-06 ENCOUNTER — HOSPITAL ENCOUNTER (EMERGENCY)
Age: 31
Discharge: ANOTHER HOSPITAL | End: 2021-05-06
Attending: EMERGENCY MEDICINE
Payer: COMMERCIAL

## 2021-05-06 ENCOUNTER — APPOINTMENT (OUTPATIENT)
Dept: VASCULAR LAB | Age: 31
End: 2021-05-06
Payer: COMMERCIAL

## 2021-05-06 ENCOUNTER — APPOINTMENT (OUTPATIENT)
Dept: CT IMAGING | Age: 31
End: 2021-05-06
Payer: COMMERCIAL

## 2021-05-06 ENCOUNTER — APPOINTMENT (OUTPATIENT)
Dept: GENERAL RADIOLOGY | Age: 31
End: 2021-05-06
Payer: COMMERCIAL

## 2021-05-06 VITALS
BODY MASS INDEX: 42.69 KG/M2 | OXYGEN SATURATION: 98 % | DIASTOLIC BLOOD PRESSURE: 88 MMHG | SYSTOLIC BLOOD PRESSURE: 154 MMHG | WEIGHT: 315 LBS | RESPIRATION RATE: 16 BRPM | TEMPERATURE: 99.2 F | HEART RATE: 101 BPM

## 2021-05-06 DIAGNOSIS — R79.89 ELEVATED D-DIMER: ICD-10-CM

## 2021-05-06 DIAGNOSIS — L03.119 CELLULITIS OF LOWER EXTREMITY, UNSPECIFIED LATERALITY: Primary | ICD-10-CM

## 2021-05-06 DIAGNOSIS — E87.6 HYPOKALEMIA: ICD-10-CM

## 2021-05-06 DIAGNOSIS — F15.10 METHAMPHETAMINE ABUSE (HCC): ICD-10-CM

## 2021-05-06 LAB
-: ABNORMAL
ABSOLUTE EOS #: 0.4 K/UL (ref 0–0.44)
ABSOLUTE IMMATURE GRANULOCYTE: <0.03 K/UL (ref 0–0.3)
ABSOLUTE LYMPH #: 1.53 K/UL (ref 1.1–3.7)
ABSOLUTE MONO #: 0.56 K/UL (ref 0.1–1.2)
ALBUMIN SERPL-MCNC: 2.6 G/DL (ref 3.5–5.2)
ALBUMIN/GLOBULIN RATIO: 0.8 (ref 1–2.5)
ALP BLD-CCNC: 162 U/L (ref 40–129)
ALT SERPL-CCNC: 132 U/L (ref 5–41)
AMORPHOUS: ABNORMAL
AMPHETAMINE SCREEN URINE: POSITIVE
ANION GAP SERPL CALCULATED.3IONS-SCNC: 7 MMOL/L (ref 9–17)
AST SERPL-CCNC: 253 U/L
BACTERIA: ABNORMAL
BARBITURATE SCREEN URINE: NEGATIVE
BASOPHILS # BLD: 1 % (ref 0–2)
BASOPHILS ABSOLUTE: 0.03 K/UL (ref 0–0.2)
BENZODIAZEPINE SCREEN, URINE: POSITIVE
BILIRUB SERPL-MCNC: 2.5 MG/DL (ref 0.3–1.2)
BILIRUBIN URINE: ABNORMAL
BNP INTERPRETATION: NORMAL
BUN BLDV-MCNC: 8 MG/DL (ref 6–20)
BUN/CREAT BLD: 12 (ref 9–20)
BUPRENORPHINE URINE: NEGATIVE
CALCIUM SERPL-MCNC: 8 MG/DL (ref 8.6–10.4)
CANNABINOID SCREEN URINE: NEGATIVE
CASTS UA: ABNORMAL /LPF
CHLORIDE BLD-SCNC: 98 MMOL/L (ref 98–107)
CO2: 31 MMOL/L (ref 20–31)
COCAINE METABOLITE, URINE: NEGATIVE
COLOR: YELLOW
COMMENT UA: ABNORMAL
CREAT SERPL-MCNC: 0.65 MG/DL (ref 0.7–1.2)
CRYSTALS, UA: ABNORMAL /HPF
D-DIMER QUANTITATIVE: 3.15 MG/L FEU (ref 0–0.59)
DIFFERENTIAL TYPE: ABNORMAL
EOSINOPHILS RELATIVE PERCENT: 8 % (ref 1–4)
EPITHELIAL CELLS UA: ABNORMAL /HPF (ref 0–5)
GFR AFRICAN AMERICAN: >60 ML/MIN
GFR NON-AFRICAN AMERICAN: >60 ML/MIN
GFR SERPL CREATININE-BSD FRML MDRD: ABNORMAL ML/MIN/{1.73_M2}
GFR SERPL CREATININE-BSD FRML MDRD: ABNORMAL ML/MIN/{1.73_M2}
GLUCOSE BLD-MCNC: 119 MG/DL (ref 70–99)
GLUCOSE URINE: NEGATIVE
HCT VFR BLD CALC: 37.7 % (ref 40.7–50.3)
HEMOGLOBIN: 12.5 G/DL (ref 13–17)
IMMATURE GRANULOCYTES: 0 %
INR BLD: 1.4
KETONES, URINE: NEGATIVE
LACTIC ACID, SEPSIS WHOLE BLOOD: NORMAL MMOL/L (ref 0.5–1.9)
LACTIC ACID, SEPSIS WHOLE BLOOD: NORMAL MMOL/L (ref 0.5–1.9)
LACTIC ACID, SEPSIS: 1.6 MMOL/L (ref 0.5–1.9)
LACTIC ACID, SEPSIS: 1.8 MMOL/L (ref 0.5–1.9)
LEUKOCYTE ESTERASE, URINE: NEGATIVE
LYMPHOCYTES # BLD: 30 % (ref 24–43)
MAGNESIUM: 1.9 MG/DL (ref 1.6–2.6)
MCH RBC QN AUTO: 32.1 PG (ref 25.2–33.5)
MCHC RBC AUTO-ENTMCNC: 33.2 G/DL (ref 28.4–34.8)
MCV RBC AUTO: 96.9 FL (ref 82.6–102.9)
MDMA URINE: ABNORMAL
METHADONE SCREEN, URINE: NEGATIVE
METHAMPHETAMINE, URINE: POSITIVE
MONOCYTES # BLD: 11 % (ref 3–12)
MUCUS: ABNORMAL
NITRITE, URINE: NEGATIVE
NRBC AUTOMATED: 0 PER 100 WBC
OPIATES, URINE: POSITIVE
OTHER OBSERVATIONS UA: ABNORMAL
OXYCODONE SCREEN URINE: NEGATIVE
PARTIAL THROMBOPLASTIN TIME: 37.5 SEC (ref 23.9–33.8)
PDW BLD-RTO: 13.9 % (ref 11.8–14.4)
PH UA: 6 (ref 5–9)
PHENCYCLIDINE, URINE: NEGATIVE
PLATELET # BLD: 165 K/UL (ref 138–453)
PLATELET ESTIMATE: ABNORMAL
PMV BLD AUTO: 11.5 FL (ref 8.1–13.5)
POTASSIUM SERPL-SCNC: 3 MMOL/L (ref 3.7–5.3)
PRO-BNP: 53 PG/ML
PROPOXYPHENE, URINE: NEGATIVE
PROTEIN UA: NEGATIVE
PROTHROMBIN TIME: 17.2 SEC (ref 11.5–14.2)
RBC # BLD: 3.89 M/UL (ref 4.21–5.77)
RBC # BLD: ABNORMAL 10*6/UL
RBC UA: ABNORMAL /HPF (ref 0–2)
RENAL EPITHELIAL, UA: ABNORMAL /HPF
SEG NEUTROPHILS: 50 % (ref 36–65)
SEGMENTED NEUTROPHILS ABSOLUTE COUNT: 2.63 K/UL (ref 1.5–8.1)
SODIUM BLD-SCNC: 136 MMOL/L (ref 135–144)
SPECIFIC GRAVITY UA: 1.02 (ref 1.01–1.02)
TEST INFORMATION: ABNORMAL
TOTAL PROTEIN: 5.9 G/DL (ref 6.4–8.3)
TRICHOMONAS: ABNORMAL
TRICYCLIC ANTIDEPRESSANTS, UR: NEGATIVE
TROPONIN INTERP: NORMAL
TROPONIN T: NORMAL NG/ML
TROPONIN, HIGH SENSITIVITY: 15 NG/L (ref 0–22)
TURBIDITY: CLEAR
URINE HGB: NEGATIVE
UROBILINOGEN, URINE: ABNORMAL
WBC # BLD: 5.2 K/UL (ref 3.5–11.3)
WBC # BLD: ABNORMAL 10*3/UL
WBC UA: ABNORMAL /HPF (ref 0–5)
YEAST: ABNORMAL

## 2021-05-06 PROCEDURE — 71045 X-RAY EXAM CHEST 1 VIEW: CPT

## 2021-05-06 PROCEDURE — 80053 COMPREHEN METABOLIC PANEL: CPT

## 2021-05-06 PROCEDURE — 80306 DRUG TEST PRSMV INSTRMNT: CPT

## 2021-05-06 PROCEDURE — 81001 URINALYSIS AUTO W/SCOPE: CPT

## 2021-05-06 PROCEDURE — 96372 THER/PROPH/DIAG INJ SC/IM: CPT

## 2021-05-06 PROCEDURE — 83735 ASSAY OF MAGNESIUM: CPT

## 2021-05-06 PROCEDURE — 85610 PROTHROMBIN TIME: CPT

## 2021-05-06 PROCEDURE — 85730 THROMBOPLASTIN TIME PARTIAL: CPT

## 2021-05-06 PROCEDURE — 83605 ASSAY OF LACTIC ACID: CPT

## 2021-05-06 PROCEDURE — 96375 TX/PRO/DX INJ NEW DRUG ADDON: CPT

## 2021-05-06 PROCEDURE — 85025 COMPLETE CBC W/AUTO DIFF WBC: CPT

## 2021-05-06 PROCEDURE — 6370000000 HC RX 637 (ALT 250 FOR IP): Performed by: EMERGENCY MEDICINE

## 2021-05-06 PROCEDURE — 85379 FIBRIN DEGRADATION QUANT: CPT

## 2021-05-06 PROCEDURE — 87040 BLOOD CULTURE FOR BACTERIA: CPT

## 2021-05-06 PROCEDURE — 87086 URINE CULTURE/COLONY COUNT: CPT

## 2021-05-06 PROCEDURE — 96365 THER/PROPH/DIAG IV INF INIT: CPT

## 2021-05-06 PROCEDURE — 99284 EMERGENCY DEPT VISIT MOD MDM: CPT

## 2021-05-06 PROCEDURE — 83880 ASSAY OF NATRIURETIC PEPTIDE: CPT

## 2021-05-06 PROCEDURE — 2580000003 HC RX 258: Performed by: EMERGENCY MEDICINE

## 2021-05-06 PROCEDURE — 84484 ASSAY OF TROPONIN QUANT: CPT

## 2021-05-06 PROCEDURE — 6360000002 HC RX W HCPCS: Performed by: EMERGENCY MEDICINE

## 2021-05-06 PROCEDURE — 96366 THER/PROPH/DIAG IV INF ADDON: CPT

## 2021-05-06 PROCEDURE — 93970 EXTREMITY STUDY: CPT

## 2021-05-06 RX ORDER — POTASSIUM CHLORIDE 20 MEQ/1
40 TABLET, EXTENDED RELEASE ORAL ONCE
Status: COMPLETED | OUTPATIENT
Start: 2021-05-06 | End: 2021-05-06

## 2021-05-06 RX ORDER — SODIUM CHLORIDE 0.9 % (FLUSH) 0.9 %
5-40 SYRINGE (ML) INJECTION PRN
Status: DISCONTINUED | OUTPATIENT
Start: 2021-05-06 | End: 2021-05-06 | Stop reason: HOSPADM

## 2021-05-06 RX ORDER — FUROSEMIDE 10 MG/ML
40 INJECTION INTRAMUSCULAR; INTRAVENOUS ONCE
Status: COMPLETED | OUTPATIENT
Start: 2021-05-06 | End: 2021-05-06

## 2021-05-06 RX ORDER — SODIUM CHLORIDE 0.9 % (FLUSH) 0.9 %
5-40 SYRINGE (ML) INJECTION EVERY 12 HOURS SCHEDULED
Status: DISCONTINUED | OUTPATIENT
Start: 2021-05-06 | End: 2021-05-06 | Stop reason: HOSPADM

## 2021-05-06 RX ORDER — SODIUM CHLORIDE 9 MG/ML
25 INJECTION, SOLUTION INTRAVENOUS PRN
Status: DISCONTINUED | OUTPATIENT
Start: 2021-05-06 | End: 2021-05-06 | Stop reason: HOSPADM

## 2021-05-06 RX ADMIN — CEFTRIAXONE SODIUM 1000 MG: 1 INJECTION, POWDER, FOR SOLUTION INTRAMUSCULAR; INTRAVENOUS at 03:57

## 2021-05-06 RX ADMIN — VANCOMYCIN HYDROCHLORIDE 1500 MG: 500 INJECTION, POWDER, LYOPHILIZED, FOR SOLUTION INTRAVENOUS at 03:57

## 2021-05-06 RX ADMIN — FUROSEMIDE 40 MG: 10 INJECTION, SOLUTION INTRAMUSCULAR; INTRAVENOUS at 03:56

## 2021-05-06 RX ADMIN — ENOXAPARIN SODIUM 150 MG: 150 INJECTION SUBCUTANEOUS at 03:56

## 2021-05-06 RX ADMIN — VANCOMYCIN HYDROCHLORIDE 1500 MG: 500 INJECTION, POWDER, LYOPHILIZED, FOR SOLUTION INTRAVENOUS at 10:07

## 2021-05-06 RX ADMIN — POTASSIUM CHLORIDE 40 MEQ: 1500 TABLET, EXTENDED RELEASE ORAL at 03:55

## 2021-05-06 ASSESSMENT — ENCOUNTER SYMPTOMS
COLOR CHANGE: 1
VOMITING: 0
BACK PAIN: 0
SHORTNESS OF BREATH: 1
CHEST TIGHTNESS: 0
NAUSEA: 0
DIARRHEA: 0
RHINORRHEA: 0
ABDOMINAL PAIN: 0
COUGH: 0

## 2021-05-06 ASSESSMENT — PAIN DESCRIPTION - PAIN TYPE: TYPE: ACUTE PAIN

## 2021-05-06 ASSESSMENT — PAIN DESCRIPTION - ORIENTATION: ORIENTATION: RIGHT;LEFT

## 2021-05-06 NOTE — ED NOTES
Bed assignment received from [facility].  Anatoliy Jauregui called to set up transport     Kalie Santiago  05/06/21 8591

## 2021-05-06 NOTE — PROGRESS NOTES
Pharmacy Note  Vancomycin Consult    Emily Bustamante is a 27 y.o. male started on Vancomycin for SSTI; consult received from Dr. Shantell Zarate to manage therapy. Also receiving the following antibiotics: Rocephin. Patient Active Problem List   Diagnosis    RUQ abdominal pain    Diaphoresis    Morbid obesity ([facility] [address]    HTN (hypertension)    Cellulitis of foot, left    Polydrug dependence including opioid type drug, episodic abuse ([facility] [address]    Problem with vascular access    Hepatitis C     Allergies:  Tramadol and Zithromax [azithromycin]     Temp max: 99.2 F    No results for input(s): BUN, CREATININE, WBC in the last 72 hours. No intake or output data in the 24 hours ending 05/06/21 0119  Culture Date      Source                       Results  See micro    Ht Readings from Last 1 Encounters:   01/22/21 6' 5\" (1.956 m)        Wt Readings from Last 1 Encounters:   05/06/21 (!) 360 lb (163.3 kg)       Body mass index is 42.69 kg/m². Using SCr 0.58 from 4/10/21 CrCl >120    Goal Trough Level: 10-15 mcg/mL    Assessment/Plan:  Will initiate Vancomycin 1,500 mg IV every 8 hours. Timing of trough level will be determined based on culture results, renal function, and clinical response. Thank you for the consult. Will continue to follow.   [employer]  Pharmacist  [phone]

## 2021-05-06 NOTE — ED PROVIDER NOTES
[facility]  Emergency Department Encounter  EmergencyMedicine Attending     Pt Name:Raoul Osullivan  MRN: [id number]  iLsandrogfjessee 1990  Date of evaluation: 5/6/21  PCP:  No primary care provider on file. CHIEF COMPLAINT       Chief Complaint   Patient presents with    Leg Pain     LEG PAIN AND REDDNESS BILAT       HISTORY OF PRESENT ILLNESS  (Location/Symptom, Timing/Onset, Context/Setting, Quality, Duration, Modifying Factors, Severity.)      Campos Babb is a 27 y.o. male who presents with erythema, warmth of the bilateral lower extremities extending from all the way to the feet to the mid thighs. No fevers or chills, history of IV drug use but denies any recent usage, says that he has been clean for the last 6 months. Patient says that this redness and warmth has been ongoing for several weeks, from the medical records it appears that the patient was seen for this roughly about 25 days ago when he was started on Bactrim Keflex, however at that time the redness was roughly 4 inches in diameter. He finished a course of Bactrim and Keflex, redness erythema kept progressing and getting worse, next he was started on a course of doxycycline by his primary care physician, redness warmth and erythema continue to progress. He was seen today by his primary care physician and told to go to a hospital for an evaluation. Complaining of bilateral lower extremity pain and swelling with redness that is now all the way up to his thighs, no chest pain, some minimal shortness of breath, no hemoptysis, no asymmetrical leg swelling, no immobilization, no fevers or chills. No nausea vomiting or any other complaints. PAST MEDICAL / SURGICAL / SOCIAL / FAMILY HISTORY      has a past medical history of Hepatitis C, Hepatitis C, Heroin abuse ([facility] [address] HTN (hypertension), and Morbid obesity ([facility] [address] has a past surgical history that includes [place] tooth extraction.     Social History Socioeconomic History    Marital status: Single     Spouse name: Not on file    Number of children: Not on file    Years of education: Not on file    Highest education level: Not on file   Occupational History    Not on file   Social Needs    Financial resource strain: Not on file    Food insecurity     Worry: Not on file     Inability: Not on file    Transportation needs     Medical: Not on file     Non-medical: Not on file   Tobacco Use    Smoking status: Current Every Day Smoker     Packs/day: 0.50     Types: Cigarettes     Last attempt to quit: 2012     Years since quittin.3    Smokeless tobacco: Never Used   Substance and Sexual Activity    Alcohol use: No    Drug use: Not Currently     Types: Opiates      Comment: history of heroin abuse    Sexual activity: Not Currently   Lifestyle    Physical activity     Days per week: Not on file     Minutes per session: Not on file    Stress: Not on file   Relationships    Social connections     Talks on phone: Not on file     Gets together: Not on file     Attends [employer] service: Not on file     Active member of club or organization: Not on file     Attends meetings of clubs or organizations: Not on file     Relationship status: Not on file    Intimate partner violence     Fear of current or ex partner: Not on file     Emotionally abused: Not on file     Physically abused: Not on file     Forced sexual activity: Not on file   Other Topics Concern    Not on file   Social History Narrative    Not on file       Family History   Problem Relation Age of Onset    Diabetes Father     Diabetes Maternal Grandfather     Diabetes Paternal Grandmother     Diabetes Paternal Grandfather        Allergies:  Tramadol and Zithromax [azithromycin]    Home Medications:  Prior to Admission medications    Medication Sig Start Date End Date Taking?  Authorizing Provider   QUEtiapine (SEROQUEL) 50 MG tablet Take 50 mg by mouth 2 times daily   Yes Historical Provider, MD       REVIEW OF SYSTEMS    (2-9 systems for level 4, 10 or more for level 5)      Review of Systems   Constitutional: Negative for chills and fever. HENT: Negative for congestion and rhinorrhea. Respiratory: Positive for shortness of breath. Negative for cough and chest tightness. Cardiovascular: Positive for leg swelling. Negative for chest pain. Gastrointestinal: Negative for abdominal pain, diarrhea, nausea and vomiting. Genitourinary: Negative for dysuria and frequency. Musculoskeletal: Negative for back pain. Skin: Positive for color change. Neurological: Negative for weakness and numbness. Psychiatric/Behavioral: Negative for confusion. PHYSICAL EXAM   (up to 7 for level 4, 8 or more for level 5)      INITIAL VITALS:   BP (!) 152/99   Pulse 101   Temp 99.2 °F (37.3 °C) (Tympanic)   Resp 18   Wt (!) 360 lb (163.3 kg)   SpO2 94%   BMI 42.69 kg/m²     Physical Exam  Vitals signs reviewed. Constitutional:       General: He is not in acute distress. Appearance: He is well-developed. HENT:      Head: Normocephalic and atraumatic. Eyes:      General:         Right eye: No discharge. Left eye: No discharge. Conjunctiva/sclera: Conjunctivae normal.   Cardiovascular:      Rate and Rhythm: Normal rate and regular rhythm. Heart sounds: Normal heart sounds. No murmur. No friction rub. No gallop. Pulmonary:      Effort: Pulmonary effort is normal. No respiratory distress. Breath sounds: Normal breath sounds. No wheezing or rales. Abdominal:      General: There is no distension. Palpations: Abdomen is soft. Tenderness: There is no abdominal tenderness. There is no guarding or rebound. Musculoskeletal:         General: Swelling present. No tenderness. Skin:     General: Skin is warm and dry. Findings: Erythema present.          DIFFERENTIAL  DIAGNOSIS     PLAN (LABS / IMAGING / EKG):  Orders Placed This Encounter Procedures    Urine Culture    Culture, Blood 1    XR CHEST PORTABLE    CBC auto differential    Comprehensive Metabolic Panel w/ Reflex to MG    Urinalysis    Lactate, Sepsis    APTT    Protime-INR    Brain Natriuretic Peptide    D-Dimer, Quantitative    Urine Drug Screen    Magnesium    Troponin    Microscopic Urinalysis    Height and weight    Pharmacy to Dose: Vancomycin       MEDICATIONS ORDERED:  Orders Placed This Encounter   Medications    sodium chloride flush 0.9 % injection 5-40 mL    sodium chloride flush 0.9 % injection 5-40 mL    0.9 % sodium chloride infusion    cefTRIAXone (ROCEPHIN) 1,000 mg in sterile water 10 mL IV syringe     Order Specific Question:   Antimicrobial Indications     Answer:   Skin and Soft Tissue Infection    vancomycin (VANCOCIN) intermittent dosing (placeholder)     Order Specific Question:   Antimicrobial Indications     Answer:   Skin and Soft Tissue Infection    vancomycin (VANCOCIN) 1,500 mg in dextrose 5 % 250 mL IVPB     Order Specific Question:   Antimicrobial Indications     Answer:   Skin and Soft Tissue Infection    furosemide (LASIX) injection 40 mg    enoxaparin (LOVENOX) injection 150 mg    potassium chloride (KLOR-CON M) extended release tablet 40 mEq       DDX: DVT versus cellulitis versus lymphadenitis versus CHF vs PE vs Atypical ACS    DIAGNOSTIC RESULTS / EMERGENCY DEPARTMENT COURSE / MDM   :  Results for orders placed or performed during the hospital encounter of 05/06/21   CBC auto differential   Result Value Ref Range    WBC 5.2 3.5 - 11.3 k/uL    RBC 3.89 (L) 4.21 - 5.77 m/uL    Hemoglobin 12.5 (L) 13.0 - 17.0 g/dL    Hematocrit 37.7 (L) 40.7 - 50.3 %    MCV 96.9 82.6 - 102.9 fL    MCH 32.1 25.2 - 33.5 pg    MCHC 33.2 28.4 - 34.8 g/dL    RDW 13.9 11.8 - 14.4 %    Platelets 015 830 - 889 k/uL    MPV 11.5 8.1 - 13.5 fL    NRBC Automated 0.0 0.0 per 100 WBC    Differential Type NOT REPORTED     Seg Neutrophils 50 36 - 65 % Lymphocytes 30 24 - 43 %    Monocytes 11 3 - 12 %    Eosinophils % 8 (H) 1 - 4 %    Basophils 1 0 - 2 %    Immature Granulocytes 0 0 %    Segs Absolute 2.63 1.50 - 8.10 k/uL    Absolute Lymph # 1.53 1.10 - 3.70 k/uL    Absolute Mono # 0.56 0.10 - 1.20 k/uL    Absolute Eos # 0.40 0.00 - 0.44 k/uL    Basophils Absolute 0.03 0.00 - 0.20 k/uL    Absolute Immature Granulocyte <0.03 0.00 - 0.30 k/uL    WBC Morphology NOT REPORTED     RBC Morphology NOT REPORTED     Platelet Estimate NOT REPORTED    Comprehensive Metabolic Panel w/ Reflex to MG   Result Value Ref Range    Glucose 119 (H) 70 - 99 mg/dL    BUN 8 6 - 20 mg/dL    CREATININE 0.65 (L) 0.70 - 1.20 mg/dL    Bun/Cre Ratio 12 9 - 20    Calcium 8.0 (L) 8.6 - 10.4 mg/dL    Sodium 136 135 - 144 mmol/L    Potassium 3.0 (L) 3.7 - 5.3 mmol/L    Chloride 98 98 - 107 mmol/L    CO2 31 20 - 31 mmol/L    Anion Gap 7 (L) 9 - 17 mmol/L    Alkaline Phosphatase 162 (H) 40 - 129 U/L     (H) 5 - 41 U/L     (H) <40 U/L    Total Bilirubin 2.50 (H) 0.3 - 1.2 mg/dL    Total Protein 5.9 (L) 6.4 - 8.3 g/dL    Albumin 2.6 (L) 3.5 - 5.2 g/dL    Albumin/Globulin Ratio 0.8 (L) 1.0 - 2.5    GFR Non-African American >60 >60 mL/min    GFR African American >60 >60 mL/min    GFR Comment          GFR Staging         Urinalysis   Result Value Ref Range    Color, UA YELLOW YELLOW    Turbidity UA CLEAR CLEAR    Glucose, Ur NEGATIVE NEGATIVE    Bilirubin Urine SMALL (A) NEGATIVE    Ketones, Urine NEGATIVE NEGATIVE    Specific Gravity, UA 1.020 1.010 - 1.020    Urine Hgb NEGATIVE NEGATIVE    pH, UA 6.0 5.0 - 9.0    Protein, UA NEGATIVE NEGATIVE    Urobilinogen, Urine ELEVATED (A) Normal    Nitrite, Urine NEGATIVE NEGATIVE    Leukocyte Esterase, Urine NEGATIVE NEGATIVE    Urinalysis Comments NOT REPORTED    Lactate, Sepsis   Result Value Ref Range    Lactic Acid, Sepsis 1.6 0.5 - 1.9 mmol/L    Lactic Acid, Sepsis, Whole Blood NOT REPORTED 0.5 - 1.9 mmol/L   Lactate, Sepsis   Result Value Ref Range    Lactic Acid, Sepsis 1.8 0.5 - 1.9 mmol/L    Lactic Acid, Sepsis, Whole Blood NOT REPORTED 0.5 - 1.9 mmol/L   APTT   Result Value Ref Range    PTT 37.5 (H) 23.9 - 33.8 sec   Protime-INR   Result Value Ref Range    Protime 17.2 (H) 11.5 - 14.2 sec    INR 1.4    Brain Natriuretic Peptide   Result Value Ref Range    Pro-BNP 53 <300 pg/mL    BNP Interpretation Pro-BNP Reference Range:    D-Dimer, Quantitative   Result Value Ref Range    D-Dimer, Quant 3.15 (H) 0.00 - 0.59 mg/L FEU   Urine Drug Screen   Result Value Ref Range    Amphetamine Screen, Ur POSITIVE (A) NEGATIVE    Barbiturate Screen, Ur NEGATIVE NEGATIVE    Benzodiazepine Screen, Urine POSITIVE (A) NEGATIVE    Cocaine Metabolite, Urine NEGATIVE NEGATIVE    Methadone Screen, Urine NEGATIVE NEGATIVE    Opiates, Urine POSITIVE (A) NEGATIVE    Phencyclidine, Urine NEGATIVE NEGATIVE    Propoxyphene, Urine NEGATIVE NEGATIVE    Cannabinoid Scrn, Ur NEGATIVE NEGATIVE    Oxycodone Screen, Ur NEGATIVE NEGATIVE    Methamphetamine, Urine POSITIVE (A) NEGATIVE    Tricyclic Antidepressants, Urine NEGATIVE NEGATIVE    MDMA, Urine NOT REPORTED NEGATIVE    Buprenorphine Urine NEGATIVE NEGATIVE    Test Information NOT REPORTED    Magnesium   Result Value Ref Range    Magnesium 1.9 1.6 - 2.6 mg/dL   Microscopic Urinalysis   Result Value Ref Range    -          WBC, UA None 0 - 5 /HPF    RBC, UA None 0 - 2 /HPF    Casts UA NOT REPORTED /LPF    Crystals, UA NOT REPORTED None /HPF    Epithelial Cells UA 0 TO 2 0 - 5 /HPF    Renal Epithelial, UA NOT REPORTED 0 /HPF    Bacteria, UA 1+ (A) None    Mucus, UA 1+ (A) None    Trichomonas, UA NOT REPORTED None    Amorphous, UA NOT REPORTED None    Other Observations UA NOT REPORTED NOT REQ.     Yeast, UA NOT REPORTED None       IMPRESSION: 61-year-old male comes in with bilateral lower extremity erythema and warmth, some signs of lymphedema and chronic findings but significant erythema and warmth progressing all the way above the knees into the thighs, these findings have changed significantly over the last few weeks to the extent that patient is now struggling to ambulate from the pain in his bilateral lower extremities. D-dimer was done which was elevated, will do a ultrasound of the bilateral lower extremities and CT chest given tachycardia and such a high D dimer. Bilateral lower extremity erythema and warmth, has been on Bactrim Keflex and doxycycline with no improvement and instead the symptoms have progressed. Patient I believe is a failed outpatient therapy, plan to start vancomycin and Rocephin. Likely admission. RADIOLOGY:    Xr Tibia Fibula Right (2 Views)    Result Date: 4/10/2021  EXAMINATION: 2 XRAY VIEWS OF THE RIGHT TIBIA AND FIBULA 4/10/2021 1:42 pm COMPARISON: None. HISTORY: ORDERING SYSTEM PROVIDED HISTORY: distal wound TECHNOLOGIST PROVIDED HISTORY: distal wound FINDINGS: Soft tissue swelling of the distal lower leg and hindfoot is noted without acute fracture or dislocation. Ankle mortise is uniform. Talar dome and talar walls are intact. Mild degenerative changes in the foot are noted. Soft tissue swelling. No acute osseous abnormality or radiopaque foreign body noted. Xr Chest Portable    Result Date: 5/6/2021  EXAMINATION: ONE XRAY VIEW OF THE CHEST 5/6/2021 12:55 am COMPARISON: 01/22/2021 HISTORY: ORDERING SYSTEM PROVIDED HISTORY: Concern for fluid overload. Bilateral leg swelling. TECHNOLOGIST PROVIDED HISTORY: Concern for fluid overload. Bilateral leg swelling. FINDINGS: Cardiomediastinal silhouette is normal in size. There is mild diffuse interstitial prominence. There is no consolidation. There is no pleural effusion or pneumothorax. There is no acute osseous abnormality. Diffuse interstitial prominence, which may suggest edema.        EKG  None    All EKG's are interpreted by the Emergency Department Physician who either signs or Co-signs this chart in the absence of a cardiologist.    EMERGENCY DEPARTMENT COURSE:    Patient was started on vancomycin and Rocephin given the bilateral lower extremity cellulitis. No fever, normal white blood count, however normal lactic acid but symptoms of erythema and warmth have been progressively getting worse. Given the swelling of the bilateral lower extremities, Lasix was given, some signs of interstitial prominence in the lungs concerning for heart failure but proBNP was normal at this time. It could still be a diastolic heart failure that the patient has. Patient D-dimer was significantly elevated, I did give the patient a Lovenox shot 1 mg/kg to cover for any possible DVT. However given the tachycardia, significantly elevated D-dimer, interstitial prominence on the x-ray, I wanted to do a CT chest to rule out a pulmonary embolus, however when patient was sent to the CT scanner, the CT table was not able to accommodate his weight. CT scan did not move with the patient on the CT table. Given the  tachycardia and elevated D-dimer and the swelling of the bilateral lower extremities, I believe patient will require CT evaluation for pulmonary embolus. Unfortunately because patient was too big for our CT scan weight limit, I will have to transfer this patient to a hospital where they can accommodate him. Will call and transfer the patient to [facility] as they do have a CT scanner that is appropriate. Patient would like to be transferred there as well due to proximity. Patient was accepted to [facility] after discussion with Dr. Marni Desai. PROCEDURES:  None    CONSULTS:  PHARMACY TO DOSE VANCOMYCIN    CRITICAL CARE:  None    FINAL IMPRESSION      1. Cellulitis of lower extremity, unspecified laterality    2. Hypokalemia    3.  Methamphetamine abuse ([facility] [address]    4. Elevated d-dimer          DISPOSITION / PLAN     DISPOSITION Decision To Admit 05/06/2021 01:52:39 AM      PATIENT REFERRED TO:  No follow-up provider specified.     DISCHARGE MEDICATIONS:  New Prescriptions    No medications on file       Becky Sanches MD  Emergency Medicine Attending    (Please note that portions of thisnote were completed with a voice recognition program.  Efforts were made to edit the dictations but occasionally words are mis-transcribed.)        Becky Sanches MD  05/06/21 [id number]

## 2021-05-06 NOTE — ED NOTES
[facility] and gave them girth numbers- abd is 68 inches and shoulder is 48 inches. Waiting for call back.       Sujata Cheng  05/06/21 [id number]

## 2021-05-06 NOTE — ED NOTES
Called [facility] for transfer to [facility], have to get pts measurements before they can contact [facility]. Waiting for numbers.       Alcon Blanton  05/06/21 1408

## 2021-05-07 LAB
CULTURE: NO GROWTH
Lab: NORMAL
SPECIMEN DESCRIPTION: NORMAL

## 2021-05-12 LAB
CULTURE: NORMAL
Lab: NORMAL
SPECIMEN DESCRIPTION: NORMAL

## 2021-05-20 ENCOUNTER — APPOINTMENT (OUTPATIENT)
Dept: CT IMAGING | Age: 31
End: 2021-05-20
Payer: COMMERCIAL

## 2021-05-20 ENCOUNTER — APPOINTMENT (OUTPATIENT)
Dept: GENERAL RADIOLOGY | Age: 31
End: 2021-05-20
Payer: COMMERCIAL

## 2021-05-20 ENCOUNTER — HOSPITAL ENCOUNTER (EMERGENCY)
Age: 31
Discharge: LEFT BEFORE DISCHARGE | End: 2021-05-20
Payer: COMMERCIAL

## 2021-05-20 VITALS
WEIGHT: 315 LBS | HEIGHT: 77 IN | DIASTOLIC BLOOD PRESSURE: 157 MMHG | HEART RATE: 90 BPM | BODY MASS INDEX: 37.19 KG/M2 | SYSTOLIC BLOOD PRESSURE: 194 MMHG | TEMPERATURE: 98.7 F | RESPIRATION RATE: 28 BRPM | OXYGEN SATURATION: 100 %

## 2021-05-20 DIAGNOSIS — F19.10 POLYSUBSTANCE ABUSE (HCC): ICD-10-CM

## 2021-05-20 DIAGNOSIS — L03.119 CELLULITIS OF LOWER EXTREMITY, UNSPECIFIED LATERALITY: Primary | ICD-10-CM

## 2021-05-20 LAB
ABSOLUTE EOS #: 0.09 K/UL (ref 0–0.44)
ABSOLUTE IMMATURE GRANULOCYTE: <0.03 K/UL (ref 0–0.3)
ABSOLUTE LYMPH #: 1.29 K/UL (ref 1.1–3.7)
ABSOLUTE MONO #: 0.51 K/UL (ref 0.1–1.2)
ALBUMIN SERPL-MCNC: 2.8 G/DL (ref 3.5–5.2)
ALBUMIN/GLOBULIN RATIO: 0.8 (ref 1–2.5)
ALP BLD-CCNC: 144 U/L (ref 40–129)
ALT SERPL-CCNC: 97 U/L (ref 5–41)
AMPHETAMINE SCREEN URINE: NEGATIVE
ANION GAP SERPL CALCULATED.3IONS-SCNC: 12 MMOL/L (ref 9–17)
AST SERPL-CCNC: 146 U/L
BARBITURATE SCREEN URINE: NEGATIVE
BASOPHILS # BLD: 1 % (ref 0–2)
BASOPHILS ABSOLUTE: 0.03 K/UL (ref 0–0.2)
BENZODIAZEPINE SCREEN, URINE: POSITIVE
BILIRUB SERPL-MCNC: 2.74 MG/DL (ref 0.3–1.2)
BNP INTERPRETATION: ABNORMAL
BUN BLDV-MCNC: 8 MG/DL (ref 6–20)
BUN/CREAT BLD: 14 (ref 9–20)
BUPRENORPHINE URINE: NEGATIVE
CALCIUM SERPL-MCNC: 8.2 MG/DL (ref 8.6–10.4)
CANNABINOID SCREEN URINE: NEGATIVE
CHLORIDE BLD-SCNC: 108 MMOL/L (ref 98–107)
CO2: 22 MMOL/L (ref 20–31)
COCAINE METABOLITE, URINE: POSITIVE
CREAT SERPL-MCNC: 0.57 MG/DL (ref 0.7–1.2)
DIFFERENTIAL TYPE: ABNORMAL
EOSINOPHILS RELATIVE PERCENT: 2 % (ref 1–4)
ETHANOL PERCENT: <0.01 %
ETHANOL: <10 MG/DL
GFR AFRICAN AMERICAN: >60 ML/MIN
GFR NON-AFRICAN AMERICAN: >60 ML/MIN
GFR SERPL CREATININE-BSD FRML MDRD: ABNORMAL ML/MIN/{1.73_M2}
GFR SERPL CREATININE-BSD FRML MDRD: ABNORMAL ML/MIN/{1.73_M2}
GLUCOSE BLD-MCNC: 101 MG/DL (ref 70–99)
HCT VFR BLD CALC: 34.4 % (ref 40.7–50.3)
HEMOGLOBIN: 11.5 G/DL (ref 13–17)
IMMATURE GRANULOCYTES: 0 %
LACTIC ACID, SEPSIS WHOLE BLOOD: NORMAL MMOL/L (ref 0.5–1.9)
LACTIC ACID, SEPSIS: 1.9 MMOL/L (ref 0.5–1.9)
LYMPHOCYTES # BLD: 30 % (ref 24–43)
MAGNESIUM: 1.9 MG/DL (ref 1.6–2.6)
MCH RBC QN AUTO: 32.5 PG (ref 25.2–33.5)
MCHC RBC AUTO-ENTMCNC: 33.4 G/DL (ref 28.4–34.8)
MCV RBC AUTO: 97.2 FL (ref 82.6–102.9)
MDMA URINE: ABNORMAL
METHADONE SCREEN, URINE: NEGATIVE
METHAMPHETAMINE, URINE: POSITIVE
MONOCYTES # BLD: 12 % (ref 3–12)
NRBC AUTOMATED: 0 PER 100 WBC
OPIATES, URINE: NEGATIVE
OXYCODONE SCREEN URINE: NEGATIVE
PDW BLD-RTO: 14.2 % (ref 11.8–14.4)
PHENCYCLIDINE, URINE: NEGATIVE
PLATELET # BLD: 178 K/UL (ref 138–453)
PLATELET ESTIMATE: ABNORMAL
PMV BLD AUTO: 10.6 FL (ref 8.1–13.5)
POTASSIUM SERPL-SCNC: 3.1 MMOL/L (ref 3.7–5.3)
PRO-BNP: 305 PG/ML
PROPOXYPHENE, URINE: NEGATIVE
RBC # BLD: 3.54 M/UL (ref 4.21–5.77)
RBC # BLD: ABNORMAL 10*6/UL
SARS-COV-2, RAPID: NOT DETECTED
SEG NEUTROPHILS: 55 % (ref 36–65)
SEGMENTED NEUTROPHILS ABSOLUTE COUNT: 2.31 K/UL (ref 1.5–8.1)
SODIUM BLD-SCNC: 142 MMOL/L (ref 135–144)
SPECIMEN DESCRIPTION: NORMAL
TEST INFORMATION: ABNORMAL
TOTAL CK: 194 U/L (ref 39–308)
TOTAL PROTEIN: 6.2 G/DL (ref 6.4–8.3)
TRICYCLIC ANTIDEPRESSANTS, UR: NEGATIVE
TROPONIN INTERP: NORMAL
TROPONIN T: NORMAL NG/ML
TROPONIN, HIGH SENSITIVITY: 12 NG/L (ref 0–22)
WBC # BLD: 4.2 K/UL (ref 3.5–11.3)
WBC # BLD: ABNORMAL 10*3/UL

## 2021-05-20 PROCEDURE — 6360000004 HC RX CONTRAST MEDICATION: Performed by: PHYSICIAN ASSISTANT

## 2021-05-20 PROCEDURE — 96367 TX/PROPH/DG ADDL SEQ IV INF: CPT

## 2021-05-20 PROCEDURE — 85025 COMPLETE CBC W/AUTO DIFF WBC: CPT

## 2021-05-20 PROCEDURE — 83735 ASSAY OF MAGNESIUM: CPT

## 2021-05-20 PROCEDURE — 36415 COLL VENOUS BLD VENIPUNCTURE: CPT

## 2021-05-20 PROCEDURE — 80053 COMPREHEN METABOLIC PANEL: CPT

## 2021-05-20 PROCEDURE — 71260 CT THORAX DX C+: CPT

## 2021-05-20 PROCEDURE — 82550 ASSAY OF CK (CPK): CPT

## 2021-05-20 PROCEDURE — G0480 DRUG TEST DEF 1-7 CLASSES: HCPCS

## 2021-05-20 PROCEDURE — 71045 X-RAY EXAM CHEST 1 VIEW: CPT

## 2021-05-20 PROCEDURE — 70450 CT HEAD/BRAIN W/O DYE: CPT

## 2021-05-20 PROCEDURE — 83605 ASSAY OF LACTIC ACID: CPT

## 2021-05-20 PROCEDURE — 84484 ASSAY OF TROPONIN QUANT: CPT

## 2021-05-20 PROCEDURE — 80306 DRUG TEST PRSMV INSTRMNT: CPT

## 2021-05-20 PROCEDURE — 87635 SARS-COV-2 COVID-19 AMP PRB: CPT

## 2021-05-20 PROCEDURE — 82805 BLOOD GASES W/O2 SATURATION: CPT

## 2021-05-20 PROCEDURE — 2580000003 HC RX 258: Performed by: PHYSICIAN ASSISTANT

## 2021-05-20 PROCEDURE — 93005 ELECTROCARDIOGRAM TRACING: CPT | Performed by: PHYSICIAN ASSISTANT

## 2021-05-20 PROCEDURE — 96365 THER/PROPH/DIAG IV INF INIT: CPT

## 2021-05-20 PROCEDURE — 83880 ASSAY OF NATRIURETIC PEPTIDE: CPT

## 2021-05-20 PROCEDURE — 99283 EMERGENCY DEPT VISIT LOW MDM: CPT

## 2021-05-20 PROCEDURE — 6360000002 HC RX W HCPCS: Performed by: PHYSICIAN ASSISTANT

## 2021-05-20 PROCEDURE — C9803 HOPD COVID-19 SPEC COLLECT: HCPCS

## 2021-05-20 RX ORDER — DOXYCYCLINE HYCLATE 100 MG
100 TABLET ORAL 2 TIMES DAILY
Qty: 20 TABLET | Refills: 0 | Status: SHIPPED | OUTPATIENT
Start: 2021-05-20 | End: 2021-05-30

## 2021-05-20 RX ORDER — DEXTROSE MONOHYDRATE 50 MG/ML
INJECTION, SOLUTION INTRAVENOUS
Status: DISCONTINUED
Start: 2021-05-20 | End: 2021-05-21 | Stop reason: HOSPADM

## 2021-05-20 RX ORDER — 0.9 % SODIUM CHLORIDE 0.9 %
1000 INTRAVENOUS SOLUTION INTRAVENOUS ONCE
Status: DISCONTINUED | OUTPATIENT
Start: 2021-05-20 | End: 2021-05-21 | Stop reason: HOSPADM

## 2021-05-20 RX ORDER — VANCOMYCIN HYDROCHLORIDE 1 G/20ML
INJECTION, POWDER, LYOPHILIZED, FOR SOLUTION INTRAVENOUS
Status: DISCONTINUED
Start: 2021-05-20 | End: 2021-05-21 | Stop reason: HOSPADM

## 2021-05-20 RX ADMIN — VANCOMYCIN HYDROCHLORIDE 1250 MG: 1 INJECTION, POWDER, LYOPHILIZED, FOR SOLUTION INTRAVENOUS at 21:35

## 2021-05-20 RX ADMIN — IOPAMIDOL 75 ML: 755 INJECTION, SOLUTION INTRAVENOUS at 18:35

## 2021-05-20 RX ADMIN — PIPERACILLIN SODIUM AND TAZOBACTAM SODIUM 4500 MG: 4; .5 INJECTION, POWDER, LYOPHILIZED, FOR SOLUTION INTRAVENOUS at 20:59

## 2021-05-20 ASSESSMENT — PAIN DESCRIPTION - ONSET: ONSET: ON-GOING

## 2021-05-20 ASSESSMENT — PAIN DESCRIPTION - FREQUENCY: FREQUENCY: CONTINUOUS

## 2021-05-20 ASSESSMENT — PAIN DESCRIPTION - LOCATION: LOCATION: KNEE

## 2021-05-20 NOTE — ED NOTES
Bed: 03  Expected date: 5/20/21  Expected time: 5:21 PM  Means of arrival:   Comments:     Verónica Mantilla RN  05/20/21 [id number]

## 2021-05-21 LAB
EKG ATRIAL RATE: 97 BPM
EKG P AXIS: 40 DEGREES
EKG P-R INTERVAL: 150 MS
EKG Q-T INTERVAL: 402 MS
EKG QRS DURATION: 104 MS
EKG QTC CALCULATION (BAZETT): 510 MS
EKG R AXIS: 51 DEGREES
EKG T AXIS: 36 DEGREES
EKG VENTRICULAR RATE: 97 BPM

## 2021-05-21 PROCEDURE — 93010 ELECTROCARDIOGRAM REPORT: CPT | Performed by: INTERNAL MEDICINE

## 2021-05-21 ASSESSMENT — ENCOUNTER SYMPTOMS
SORE THROAT: 0
CHEST TIGHTNESS: 0
EYE DISCHARGE: 0
ABDOMINAL PAIN: 0
BLOOD IN STOOL: 0
BACK PAIN: 0
EYE REDNESS: 0
SHORTNESS OF BREATH: 0
WHEEZING: 0
CONSTIPATION: 0
NAUSEA: 0
VOMITING: 0
RHINORRHEA: 0
COUGH: 0
DIARRHEA: 0

## 2021-05-21 NOTE — ED PROVIDER NOTES
pain and myalgias. Skin: Positive for wound. Negative for pallor and rash. Allergic/Immunologic: Negative for food allergies and immunocompromised state. Neurological: Negative for dizziness, syncope, weakness and light-headedness. Hematological: Negative for adenopathy. Does not bruise/bleed easily. Psychiatric/Behavioral: Negative for behavioral problems and suicidal ideas. The patient is not nervous/anxious. Except as noted above the remainder of the review of systems was reviewed and negative.        PAST MEDICAL HISTORY     Past Medical History:   Diagnosis Date    Hepatitis C     Hepatitis C 2019    Heroin abuse ([facility] [address]     HTN (hypertension) 12/10/2012    Morbid obesity ([facility] [address] 12/10/2012         SURGICALHISTORY       Past Surgical History:   Procedure Laterality Date    WISDOM TOOTH EXTRACTION      age 25         CURRENT MEDICATIONS       Discharge Medication List as of 2021  9:45 PM      CONTINUE these medications which have NOT CHANGED    Details   QUEtiapine (SEROQUEL) 50 MG tablet Take 50 mg by mouth 2 times dailyHistorical Med               ALLERGIES   Tramadol and Zithromax [azithromycin]    FAMILY HISTORY       Family History   Problem Relation Age of Onset    Diabetes Father     Diabetes Maternal Grandfather     Diabetes Paternal Grandmother     Diabetes Paternal Grandfather           SOCIAL HISTORY       Social History     Socioeconomic History    Marital status: Single     Spouse name: Not on file    Number of children: Not on file    Years of education: Not on file    Highest education level: Not on file   Occupational History    Not on file   Tobacco Use    Smoking status: Current Every Day Smoker     Packs/day: 0.50     Types: Cigarettes     Last attempt to quit: 2012     Years since quittin.4    Smokeless tobacco: Never Used   Substance and Sexual Activity    Alcohol use: No    Drug use: Not Currently     Types: Opiates      Comment: history of heroin abuse    Sexual activity: Not Currently   Other Topics Concern    Not on file   Social History Narrative    Not on file     Social Determinants of Health     Financial Resource Strain:     Difficulty of Paying Living Expenses:    Food Insecurity:     Worried About Running Out of Food in the Last Year:     [address] in the Last Year:    Transportation Needs:     Lack of Transportation (Medical):  Lack of Transportation (Non-Medical):    Physical Activity:     Days of Exercise per Week:     Minutes of Exercise per Session:    Stress:     Feeling of Stress :    Social Connections:     Frequency of Communication with Friends and Family:     Frequency of Social Gatherings with Friends and Family:     Attends Rastafarian Services:     Active Member of Clubs or Organizations:     Attends Club or Organization Meetings:     Marital Status:    Intimate Partner Violence:     Fear of Current or Ex-Partner:     Emotionally Abused:     Physically Abused:     Sexually Abused:        SCREENINGS             PHYSICAL EXAM    (up to 7 for level 4, 8 or more for level 5)     ED Triage Vitals [05/20/21 1739]   BP Temp Temp Source Pulse Resp SpO2 Height Weight   (!) 194/157 98.7 °F (37.1 °C) Tympanic 90 28 100 % 6' 5\" (1.956 m) (!) 380 lb (172.4 kg)       Physical Exam  Vitals and nursing note reviewed. Constitutional:       General: He is not in acute distress. Appearance: He is well-developed. He is ill-appearing and diaphoretic. HENT:      Head: Normocephalic and atraumatic. Right Ear: External ear normal.      Left Ear: External ear normal.      Mouth/Throat:      Mouth: Mucous membranes are moist.      Pharynx: Oropharynx is clear. No oropharyngeal exudate. Eyes:      General: No scleral icterus. Right eye: No discharge. Left eye: No discharge. Extraocular Movements: Extraocular movements intact.       Conjunctiva/sclera: Conjunctivae normal.      Pupils: Pupils are equal, round, and reactive to light. Neck:      Thyroid: No thyromegaly. Trachea: No tracheal deviation. Cardiovascular:      Rate and Rhythm: Normal rate and regular rhythm. Heart sounds: No murmur heard. No friction rub. No gallop. Pulmonary:      Effort: Pulmonary effort is normal. No respiratory distress. Breath sounds: Normal breath sounds. No stridor. No wheezing, rhonchi or rales. Abdominal:      General: Bowel sounds are normal. There is no distension. Palpations: Abdomen is soft. Tenderness: There is no guarding or rebound. Musculoskeletal:         General: Swelling present. No tenderness or deformity. Normal range of motion. Cervical back: Normal range of motion and neck supple. Comments: Extensive bilateral lower extremity edema erythema. Wound noted to the right lower leg with erythema noted going up the leg. Intact pulses sensation cap refills less than 2 seconds. Lymphadenopathy:      Cervical: No cervical adenopathy. Skin:     General: Skin is warm. Capillary Refill: Capillary refill takes 2 to 3 seconds. Coloration: Skin is not cyanotic. Findings: Erythema present. No rash. Neurological:      General: No focal deficit present. Mental Status: He is alert and oriented to person, place, and time. Cranial Nerves: No cranial nerve deficit. Motor: No abnormal muscle tone. Deep Tendon Reflexes: Reflexes are normal and symmetric.  Reflexes normal.   Psychiatric:         Behavior: Behavior normal.         DIAGNOSTIC RESULTS     EKG: All EKG's are interpreted by the Emergency Department Physician who either signs orCo-signs this chart in the absence of a cardiologist.      RADIOLOGY:   Non-plainfilm images such as CT, Ultrasound and MRI are read by the radiologist. Plain radiographic images are visualized and preliminarily interpreted by the emergency physician with the below findings:      Interpretationper the Radiologist below, if available at the time of this note:    CT CHEST PULMONARY EMBOLISM W CONTRAST   Final Result   No obvious evidence of pulmonary embolism      No acute pulmonary abnormality. CT Head WO Contrast   Final Result   No acute intracranial abnormality. XR CHEST PORTABLE   Final Result   Normal cardiac size. Diffuse interstitial prominence suggestive of   interstitial edema versus fluid overload or viral pneumonitis. ED BEDSIDE ULTRASOUND:   Performed by ED Physician - none    LABS:  Labs Reviewed   CBC WITH AUTO DIFFERENTIAL - Abnormal; Notable for the following components:       Result Value    RBC 3.54 (*)     Hemoglobin 11.5 (*)     Hematocrit 34.4 (*)     All other components within normal limits   COMPREHENSIVE METABOLIC PANEL - Abnormal; Notable for the following components:    Glucose 101 (*)     CREATININE 0.57 (*)     Calcium 8.2 (*)     Potassium 3.1 (*)     Chloride 108 (*)     Alkaline Phosphatase 144 (*)     ALT 97 (*)      (*)     Total Bilirubin 2.74 (*)     Total Protein 6.2 (*)     Albumin 2.8 (*)     Albumin/Globulin Ratio 0.8 (*)     All other components within normal limits   BRAIN NATRIURETIC PEPTIDE - Abnormal; Notable for the following components:    Pro- (*)     All other components within normal limits   URINE DRUG SCREEN - Abnormal; Notable for the following components:    Benzodiazepine Screen, Urine POSITIVE (*)     Cocaine Metabolite, Urine POSITIVE (*)     Methamphetamine, Urine POSITIVE (*)     All other components within normal limits   COVID-19, RAPID   LACTATE, SEPSIS   TROPONIN   ETHANOL   CK   MAGNESIUM       All other labs were within normal range or not returned as of this dictation.     EMERGENCY DEPARTMENT COURSE and DIFFERENTIAL DIAGNOSIS/MDM:   Vitals:    Vitals:    05/20/21 1739   BP: (!) 194/157   Pulse: 90   Resp: 28   Temp: 98.7 °F (37.1 °C)   TempSrc: Tympanic   SpO2: 100%   Weight: (!) 380 lb (172.4 kg) Height: 6' 5\" (1.956 m)       MDM  66-year-old male known drug abuser who took an use just prior to arrival presents slightly diaphoretic with noted infection. Recent admission for the cellulitis. Seems to still have cellulitis at this time. Not on antibiotics at home. This point concern for what he took. He denies any chest pain or shortness of breath. At this point will get complete work-up to include rule out of acute cause of transient mental status change infection dehydration electrolyte imbalance. Repeat evaluation patient again continues to be awake alert and oriented. We are waiting blood work-up. I explained to patient that given his drug use he is going to struggle with fighting infection. I explained to him that he has multiple problems going on at this time and I think that he would benefit from admission to the hospital or even a potential transfer. He states that he does not want to stop using drugs at this time and is unsure if he can. I offered him ways to stop using drugs and rehab the patient states he does not want to stay. I explained to him at this point if he does not stay there is a very high risk that he could go home and die. If not of drug abuse then of cardiovascular problems infections. Sepsis. This was all discussed and per the nurse, Rodney Hollis. At this point patient is leaving [address] and understands he can go home and die. He understands we are always here and want to take care of them. I will send him an antibiotic to the pharmacy. He will otherwise return to the ER with any new or worse complaints. Has been given primary care for follow-up. Procedures    FINAL IMPRESSION      1. Cellulitis of lower extremity, unspecified laterality    2.  Polysubstance abuse [facility])        DISPOSITION/PLAN   DISPOSITION Wideman 05/20/2021 09:43:15 PM      PATIENT REFERRED TO:  [facility] ED  [address]  [phone]    If symptoms worsen, As needed    [address] [facility]  [phone]          DISCHARGE MEDICATIONS:  Discharge Medication List as of 5/20/2021  9:45 PM      START taking these medications    Details   doxycycline hyclate (VIBRA-TABS) 100 MG tablet Take 1 tablet by mouth 2 times daily for 10 days, Disp-20 tablet, R-0Normal                    Summation      Patient Course:      ED Medications administered this visit:    Medications   piperacillin-tazobactam (ZOSYN) 4,500 mg in dextrose 5 % 100 mL IVPB (mini-bag) (0 mg Intravenous Stopped 5/20/21 2132)   vancomycin (VANCOCIN) 1,250 mg in dextrose 5 % 250 mL IVPB (0 mg Intravenous Stopped 5/20/21 2305)   iopamidol (ISOVUE-370) 76 % injection 75 mL (75 mLs Intravenous Given 5/20/21 1835)       New Prescriptions from this visit:    Discharge Medication List as of 5/20/2021  9:45 PM      START taking these medications    Details   doxycycline hyclate (VIBRA-TABS) 100 MG tablet Take 1 tablet by mouth 2 times daily for 10 days, Disp-20 tablet, R-0Normal             Follow-up:  [facility] ED  [address] [facility]  [phone]    If symptoms worsen, As needed    [place] [address]  [phone]            Final Impression:   1. Cellulitis of lower extremity, unspecified laterality    2.  Polysubstance abuse ([facility] [address]               (Please note that portions of this note were completed with a voice recognition program.  Efforts were made to edit the dictations but occasionally words are mis-transcribed.)           Eri Escobar PA-C  05/21/21 1679

## 2021-08-09 ENCOUNTER — HOSPITAL ENCOUNTER (OUTPATIENT)
Age: 31
Setting detail: SPECIMEN
Discharge: HOME | End: 2021-08-09
Payer: COMMERCIAL

## 2021-08-10 LAB
-: NORMAL
AMORPHOUS: NORMAL
BACTERIA: NORMAL
BILIRUBIN URINE: ABNORMAL
CASTS UA: NORMAL /LPF (ref 0–8)
COLOR: ABNORMAL
COMMENT UA: ABNORMAL
CRYSTALS, UA: NORMAL /HPF
EPITHELIAL CELLS UA: NORMAL /HPF (ref 0–5)
GLUCOSE URINE: NEGATIVE
KETONES, URINE: ABNORMAL
LEUKOCYTE ESTERASE, URINE: ABNORMAL
MUCUS: NORMAL
NITRITE, URINE: POSITIVE
OTHER OBSERVATIONS UA: NORMAL
PH UA: 6 (ref 5–8)
PROTEIN UA: ABNORMAL
RBC UA: NORMAL /HPF (ref 0–4)
RENAL EPITHELIAL, UA: NORMAL /HPF
SPECIFIC GRAVITY UA: 1.04 (ref 1–1.03)
TRICHOMONAS: NORMAL
TURBIDITY: ABNORMAL
URINE HGB: NEGATIVE
UROBILINOGEN, URINE: ABNORMAL
WBC UA: NORMAL /HPF (ref 0–5)
YEAST: NORMAL

## 2021-09-02 ENCOUNTER — HOSPITAL ENCOUNTER (OUTPATIENT)
Age: 31
Setting detail: SPECIMEN
Discharge: HOME | End: 2021-09-02
Payer: COMMERCIAL

## 2021-09-03 LAB
-: ABNORMAL
AMORPHOUS: ABNORMAL
BACTERIA: ABNORMAL
BILIRUBIN URINE: ABNORMAL
CASTS UA: ABNORMAL /LPF (ref 0–2)
CASTS UA: ABNORMAL /LPF (ref 0–2)
COLOR: ABNORMAL
COMMENT UA: ABNORMAL
CRYSTALS, UA: ABNORMAL /HPF
CRYSTALS, UA: ABNORMAL /HPF
EPITHELIAL CELLS UA: ABNORMAL /HPF (ref 0–5)
GLUCOSE URINE: NEGATIVE
KETONES, URINE: NEGATIVE
LEUKOCYTE ESTERASE, URINE: ABNORMAL
MUCUS: ABNORMAL
NITRITE, URINE: POSITIVE
OTHER OBSERVATIONS UA: ABNORMAL
PH UA: 5.5 (ref 5–8)
PROTEIN UA: ABNORMAL
RBC UA: ABNORMAL /HPF (ref 0–2)
RENAL EPITHELIAL, UA: ABNORMAL /HPF
SOURCE: NORMAL
SPECIFIC GRAVITY UA: 1.04 (ref 1–1.03)
TRICHOMONAS VAGINALI, MOLECULAR: NEGATIVE
TRICHOMONAS: ABNORMAL
TURBIDITY: ABNORMAL
URINE HGB: NEGATIVE
UROBILINOGEN, URINE: NORMAL
WBC UA: ABNORMAL /HPF (ref 0–5)
YEAST: ABNORMAL

## 2021-09-07 LAB
C. TRACHOMATIS DNA ,URINE: NEGATIVE
N. GONORRHOEAE DNA, URINE: NEGATIVE
SPECIMEN DESCRIPTION: NORMAL

## 2022-01-13 ENCOUNTER — HOSPITAL ENCOUNTER (OUTPATIENT)
Age: 32
Setting detail: SPECIMEN
Discharge: HOME | End: 2022-01-13

## 2022-01-14 LAB
-: ABNORMAL
AMORPHOUS: ABNORMAL
BACTERIA: ABNORMAL
BILIRUBIN URINE: ABNORMAL
CASTS UA: ABNORMAL /LPF (ref 0–8)
COLOR: ABNORMAL
COMMENT UA: ABNORMAL
CRYSTALS, UA: ABNORMAL /HPF
EPITHELIAL CELLS UA: ABNORMAL /HPF (ref 0–5)
GLUCOSE URINE: NEGATIVE
KETONES, URINE: NEGATIVE
LEUKOCYTE ESTERASE, URINE: ABNORMAL
MUCUS: ABNORMAL
NITRITE, URINE: NEGATIVE
OTHER OBSERVATIONS UA: ABNORMAL
PH UA: 7.5 (ref 5–8)
PROTEIN UA: NEGATIVE
RBC UA: ABNORMAL /HPF (ref 0–4)
RENAL EPITHELIAL, UA: ABNORMAL /HPF
SOURCE: NORMAL
SPECIFIC GRAVITY UA: 1.02 (ref 1–1.03)
TRICHOMONAS VAGINALI, MOLECULAR: NEGATIVE
TRICHOMONAS: ABNORMAL
TURBIDITY: CLEAR
URINE HGB: NEGATIVE
UROBILINOGEN, URINE: ABNORMAL
WBC UA: ABNORMAL /HPF (ref 0–5)
YEAST: ABNORMAL

## 2022-09-26 ENCOUNTER — OFFICE VISIT (OUTPATIENT)
Dept: CARDIOLOGY | Age: 32
End: 2022-09-26
Payer: COMMERCIAL

## 2022-09-26 VITALS
OXYGEN SATURATION: 98 % | BODY MASS INDEX: 37.19 KG/M2 | HEART RATE: 74 BPM | DIASTOLIC BLOOD PRESSURE: 70 MMHG | SYSTOLIC BLOOD PRESSURE: 127 MMHG | RESPIRATION RATE: 18 BRPM | WEIGHT: 315 LBS | HEIGHT: 77 IN

## 2022-09-26 DIAGNOSIS — R18.8 ASCITES OF LIVER: ICD-10-CM

## 2022-09-26 DIAGNOSIS — E78.2 MIXED HYPERLIPIDEMIA: ICD-10-CM

## 2022-09-26 DIAGNOSIS — K72.90 LIVER FAILURE WITHOUT HEPATIC COMA, UNSPECIFIED CHRONICITY (HCC): ICD-10-CM

## 2022-09-26 DIAGNOSIS — R06.02 SOB (SHORTNESS OF BREATH): ICD-10-CM

## 2022-09-26 DIAGNOSIS — R00.2 PALPITATION: Primary | ICD-10-CM

## 2022-09-26 DIAGNOSIS — I10 ESSENTIAL HYPERTENSION: ICD-10-CM

## 2022-09-26 PROCEDURE — G8427 DOCREV CUR MEDS BY ELIG CLIN: HCPCS | Performed by: PHYSICIAN ASSISTANT

## 2022-09-26 PROCEDURE — 99205 OFFICE O/P NEW HI 60 MIN: CPT | Performed by: PHYSICIAN ASSISTANT

## 2022-09-26 PROCEDURE — G8417 CALC BMI ABV UP PARAM F/U: HCPCS | Performed by: PHYSICIAN ASSISTANT

## 2022-09-26 PROCEDURE — 4004F PT TOBACCO SCREEN RCVD TLK: CPT | Performed by: PHYSICIAN ASSISTANT

## 2022-09-26 PROCEDURE — 93000 ELECTROCARDIOGRAM COMPLETE: CPT | Performed by: FAMILY MEDICINE

## 2022-09-26 RX ORDER — SPIRONOLACTONE 100 MG/1
TABLET, FILM COATED ORAL
COMMUNITY
Start: 2022-07-23 | End: 2022-09-26 | Stop reason: SDUPTHER

## 2022-09-26 RX ORDER — POTASSIUM CHLORIDE 750 MG/1
TABLET, FILM COATED, EXTENDED RELEASE ORAL
COMMUNITY
Start: 2022-08-25 | End: 2022-09-26

## 2022-09-26 RX ORDER — POTASSIUM CHLORIDE 20 MEQ/1
TABLET, EXTENDED RELEASE ORAL
COMMUNITY
Start: 2022-09-19

## 2022-09-26 RX ORDER — ENTECAVIR 0.5 MG/1
TABLET, FILM COATED ORAL
COMMUNITY
Start: 2022-08-25

## 2022-09-26 RX ORDER — BUMETANIDE 1 MG/1
TABLET ORAL
COMMUNITY
Start: 2022-07-23 | End: 2022-09-26 | Stop reason: SDUPTHER

## 2022-09-26 RX ORDER — BUMETANIDE 1 MG/1
TABLET ORAL
Qty: 30 TABLET | Refills: 3 | Status: SHIPPED | OUTPATIENT
Start: 2022-09-26

## 2022-09-26 RX ORDER — BUPRENORPHINE AND NALOXONE 8; 2 MG/1; MG/1
FILM, SOLUBLE BUCCAL; SUBLINGUAL
COMMUNITY
Start: 2022-09-22

## 2022-09-26 RX ORDER — SPIRONOLACTONE 100 MG/1
TABLET, FILM COATED ORAL
Qty: 30 TABLET | Refills: 3 | Status: SHIPPED | OUTPATIENT
Start: 2022-09-26

## 2022-09-26 RX ORDER — HYDROCHLOROTHIAZIDE 25 MG/1
25 TABLET ORAL DAILY
COMMUNITY

## 2022-09-26 NOTE — PROGRESS NOTES
I, Zoya David am scribing for and in the presence of Brianna Lewis PA-C. Patient: Ghazala Walsh  : 1990  Date of Visit: 2022    REASON FOR VISIT / CONSULTATION: Establish Cardiologist (EKG done today. HX:palp Pt is here [email or web address] care today he states he has palp it started years ago but ignored it. He also noticing legs and stomach swelling thinks it is fluid. He goes to [facility] and has parentheses. SOB Denies:CP, lightheaded/dizziness)      History of Present Illness:        Dear No primary care provider on file. I had the pleasure of seeing Ghazala Walsh today. Mr. Meghan Godfrey is a 28 y.o. male  with a history of intermittent palpitations. His mother has heart disease and stents placed at 52years old. He has a history of hypertension, he has been treated with medication for the past 3 to 4 years. He denies any known heart problems. He is a smoker, he smokes 5 to 7 cigarettes per day, previously he was 1 pack or more per day for the past 20 years. He began smoking at age 6 or 15. He denies any known history of sleep apnea, thyroid issues, or diabetes. He denies any current drug or alcohol use. He previously used opiates and heroin in the past. He has been sober for the past 10 months. He reports he drinks coffee and 2 to 3 pops per day. He reports he has hepatitis B, history of hepatitis C in the past. He was never on medication for his hepatitis. He does have issues with his liver and he has had 3 paracentesis done in the past. Last was 2022, the beginning of September, and again last week. This was done [facility]. He thinks they take about 13 liters of fluid off  at a time. He reports he has been on water pills but was not able to get his water pills refilled. It helped with the paracentesis not needing to be done as frequently. He reports he has gained 50 pounds over the past 2 to 3 weeks without his diuretics.  He is noncompliant with a low salt diet as directed. He sees Dr Stephania Martinez GI and infectious disease. He is having palpitations that are starting to bother him now. He states he ignored them in the past. He does noticed them a couple of times a week, lasts seconds, happen with exertion. He does have shortness of breath, especially when he has increased swelling. He states he has gained 50 pounds over the last two weeks. He reports he is not very compliant with his diet. He usually drinks pop all day, he has been trying to drink apple juice instead. Today he can walk about 100 feet. Denies stomach pain, nausea or vomiting. Denies cough, fever or chills. Denies lightheaded/dizziness. He is fairly active he works daily. He sleeps well nightly denies snoring or waking up gasping for air. he denied any current or recent chest pain, abdominal pain, bleeding problems, problems with his medications or any other concerns at this time. EKG done today in office 2022- Normal sinus rhythm. PAST MEDICAL HISTORY:        Past Medical History:   Diagnosis Date    Hepatitis C     Hepatitis C 2019    Heroin abuse ([facility] [address]     HTN (hypertension) 12/10/2012    Morbid obesity ([facility] [address] 12/10/2012       CURRENT ALLERGIES: Tramadol and Zithromax [azithromycin] REVIEW OF SYSTEMS: 14 systems were reviewed. Pertinent positives and negatives as above, all else negative.      Past Surgical History:   Procedure Laterality Date    WISDOM TOOTH EXTRACTION      age 25    Social History:  Social History     Tobacco Use    Smoking status: Every Day     Packs/day: 0.50     Types: Cigarettes     Last attempt to quit: 2012     Years since quittin.7    Smokeless tobacco: Never   Vaping Use    Vaping Use: Never used   Substance Use Topics    Alcohol use: No    Drug use: Not Currently     Types: Opiates      Comment: history of heroin abuse        CURRENT MEDICATIONS:        Outpatient Medications Marked as Taking for the 22 encounter (Office Visit) with Williams Haas PA-C   Medication Sig Dispense Refill    hydroCHLOROthiazide (HYDRODIURIL) 25 MG tablet Take 25 mg by mouth daily      entecavir (BARACLUDE) 0.5 MG tablet TAKE 1 TABLET BY MOUTH ONCE DAILY TWO hours BEFORE OR AFTER meals      buprenorphine-naloxone (SUBOXONE) 8-2 MG FILM SL film dissolve 1 FILM under the tongue twice a day AVOID FOOD OR DRINKS UNTIL FILM DISSOLVES      potassium chloride (KLOR-CON M) 20 MEQ extended release tablet       spironolactone (ALDACTONE) 100 MG tablet take 1 tablet by mouth once daily 30 tablet 3    bumetanide (BUMEX) 1 MG tablet take 1 tablet by mouth once daily 30 tablet 3    QUEtiapine (SEROQUEL) 50 MG tablet Take 50 mg by mouth 2 times daily         FAMILY HISTORY: family history includes Coronary Art Dis in his mother; Diabetes in his father, maternal grandfather, paternal grandfather, and paternal grandmother. Physical Examination:     /70 (Site: Left Upper Arm, Position: Sitting, Cuff Size: Large Adult)   Pulse 74   Resp 18   Ht 6' 5\" (1.956 m)   Wt (!) 473 lb 12.8 oz (214.9 kg)   SpO2 98%   BMI 56.18 kg/m²  Body mass index is 56.18 kg/m². Constitutional: He is oriented to person, place, and time. He appears well-developed and well-nourished. In no acute distress. HEENT: Normocephalic and atraumatic. No JVD present. Carotid bruit is not present. No mass and no thyromegaly present. No lymphadenopathy present. Cardiovascular: Normal rate, regular rhythm, normal heart sounds. Exam reveals no gallop and no friction rubs. 2/6 systolic murmur, 5th intercostal space on the LEFT in the mid-clavicular line (cardiac apex). Pulmonary/Chest: Effort normal and breath sounds normal. No respiratory distress. He has no wheezes, rhonchi or rales. Abdominal: Soft, non-tender. Bowel sounds and aorta are normal. He exhibits no organomegaly, mass or bruit. Extremities: 2+ up to the knees bilaterally. No cyanosis or clubbing. 2+ radial and carotid pulses.  Distal extremity pulses: 1+ bilaterally. Neurological: He is alert and oriented to person, place, and time. No evidence of gross cranial nerve deficit. Coordination appeared normal.   Skin: Skin is warm and dry. There is no rash or diaphoresis. Psychiatric: He has a normal mood and affect. His speech is normal and behavior is normal.        MOST RECENT LABS ON RECORD:   Lab Results   Component Value Date    WBC 4.2 05/20/2021    HGB 11.5 (L) 05/20/2021    HCT 34.4 (L) 05/20/2021     05/20/2021    ALT 97 (H) 05/20/2021     (H) 05/20/2021     05/20/2021    K 3.1 (L) 05/20/2021     (H) 05/20/2021    CREATININE 0.57 (L) 05/20/2021    BUN 8 05/20/2021    CO2 22 05/20/2021    INR 1.4 05/06/2021       ASSESSMENT:     1. Palpitation    2. Ascites of liver    3. Liver failure without hepatic coma, unspecified chronicity ([facility] [address]    4. Essential hypertension    5. Mixed hyperlipidemia    6. SOB (shortness of breath)          PLAN:         Recurrent intermittent palpitations: Rate Control Symptomatic  Beta Blocker: Not indicated at this time. Calcium Channel Blocker: Not indicated at this time. Not indicated at this time. Additional Testing List: I ordered a echocardiogram to better assess for the etiology of this problem and to help guide future management  Because of his recent symptoms, I ordered a CAM Event monitor to try and pinpoint the etiology of these symptoms. Additional Testing List: Additional Testing List: Because of current signs and symptoms which can certainly be caused by significant coronary artery disease, I ordered a treadmill stress test with SPECT imaging to try and rule out this possibility. Chronic liver failure, Hepatitis C, recurrent paracentesis   Beta Blocker: Not indicated at this time. ACE Inibitor/ARB: Not indicated at this time. Diuretics: RE-START bumetinide (Bumex) 1 mg every morning.  I also discussed the potential side effects of this medication including lightheadedness and dizziness and instructed them to stop the medication of this occurs and call our office if this occurs. Diuretics: RE-START Spironolactone (Aldactone) 100 mg daily. I also discussed the potential side effects of this medication including lightheadedness and dizziness and instructed them to stop the medication of this occurs and call our office if this occurs. Heart failure counseling: I advised them to try and keep their legs up whenever possible and to limit salt in their diet. Additional Testing List: I ordered a echocardiogram to better assess for the etiology of this problem and to help guide future management  I did call and speak with his GI physician. They are arranging for him to get in with hepatology and they will see him every 2 weeks for paracentesis until his medications are well controlled and he is stable. They report he is not complaint with medications or diet. I also spent 5 to 10 minutes discussing diet and leg compression to help with swelling. He is consuming many sugary beverages, we discussed in detail decreasing sugar, salt, calorie intake with beverage modifications alone. Essential Hypertension: Controlled  Beta Blocker: Not indicated at this time. ACE Inibitor/ARB: Not indicated at this time. Calcium Channel Blocker: Not indicated at this time. Diuretics: Continue Hydrochlorothiazide (HCTZ) 25 mg every morning. Diuretics: RE-START bumetinide (Bumex) 1 mg every morning. I also discussed the potential side effects of this medication including lightheadedness and dizziness and instructed them to stop the medication of this occurs and call our office if this occurs. Diuretics: RE-START Spironolactone (Aldactone) 100 mg daily. I also discussed the potential side effects of this medication including lightheadedness and dizziness and instructed them to stop the medication of this occurs and call our office if this occurs.    Additional Testing List: I ordered a echocardiogram to better assess for the etiology of this problem and to help guide future management and Because of current signs and symptoms which can certainly be caused by significant coronary artery disease, I ordered a treadmill stress test with SPECT imaging to try and rule out this possibility. I took the liberty of ordering a BMP for today to assess their potassium and renal function. I told them that they could get their lab work performed at the location of their choosing, unfortunately, if the lab work was not performed at a [facility] facility I could not guarantee my ability to follow up with them on their results. ,  I took the liberty of ordering a CBC. I told them that they could get their lab work performed at the location of their choosing, unfortunately, if the lab work was not performed at a [facility] facility I could not guarantee my ability to follow up with them on their results. , I took the liberty of ordering a TSH with reflex T4. I told them that they could get their lab work performed at the location of their choosing, unfortunately, if the lab work was not performed at a [facility] facility I could not guarantee my ability to follow up with them on their results. , and I ordered a complete metabolic panel (CMP). I told them that they could get their lab work performed at the location of their choosing, unfortunately, if the lab work was not performed at a [facility] facility I could not guarantee my ability to follow up with them on their results. Hyperlipidemia: Mixed  Cholesterol Reduction Therapy: Not indicated at this time. I ordered a repeat lipid panel to be done.     Shortness of breath with almost any exertion:  Additional Testing List: I ordered a echocardiogram to better assess for the etiology of this problem and to help guide future management and Because of current signs and symptoms which can certainly be caused by significant coronary artery disease, I ordered a treadmill stress test with SPECT imaging to try and rule out this possibility. He will be contacted by the GI office to schedule a paracentesis. In the meantime, I encouraged Mr. Jose L Pelaez to continue to take his other medications. I discussed patient's symptoms and treatment plan with Dr Adriana Candelaria, he was in agreement with the plan and follow up. FOLLOW UP:   I told Mr. Jose L Pelaez to call my office if he had any problems, but otherwise I asked him to Return in about 6 weeks (around 11/7/2022). However, I would be happy to see him sooner should the need arise. Sincerely,  Carmen Reeves PA-C  [facility]ist    [address] [facility], [address] [place] [address]  Phone: [phone], Fax: [phone]     I believe that the risk of significant morbidity and mortality related to the patient's current medical conditions are: high. >60 minutes were spent during prep work, discussion and exam of the patient, and follow up documentation and all of their questions were answered. The documentation recorded by the scribe, accurately and completely reflects the services I personally performed and the decisions made by me.  Carmen Reeves PA-C September 26, 2022

## 2022-09-26 NOTE — PATIENT INSTRUCTIONS
SURVEY:    You may be receiving a survey from [employer] regarding your visit today. Please complete the survey to enable us to provide the highest quality of care to you and your family. If you cannot score us a very good on any question, please call the office to discuss how we could have made your experience a very good one. Thank you.

## 2022-09-28 ENCOUNTER — TELEPHONE (OUTPATIENT)
Dept: CARDIOLOGY | Age: 32
End: 2022-09-28

## 2022-09-28 NOTE — TELEPHONE ENCOUNTER
Path labs called and wanted to know if you want Mr. Ace Brunoak TSH to be changed to a total T4 or a free T4? That way she can order it correctly tomorrow. Please advise. Thank you!

## 2022-12-07 RX ORDER — SPIRONOLACTONE 100 MG/1
TABLET, FILM COATED ORAL
Qty: 90 TABLET | Refills: 3 | Status: SHIPPED | OUTPATIENT
Start: 2022-12-07

## 2022-12-07 RX ORDER — BUMETANIDE 1 MG/1
TABLET ORAL
Qty: 90 TABLET | Refills: 3 | Status: SHIPPED | OUTPATIENT
Start: 2022-12-07

## 2023-07-06 ENCOUNTER — HOSPITAL ENCOUNTER (EMERGENCY)
Age: 33
Discharge: HOME | End: 2023-07-06
Payer: COMMERCIAL

## 2023-07-06 VITALS
HEART RATE: 65 BPM | OXYGEN SATURATION: 100 % | DIASTOLIC BLOOD PRESSURE: 91 MMHG | TEMPERATURE: 97.52 F | RESPIRATION RATE: 20 BRPM | SYSTOLIC BLOOD PRESSURE: 158 MMHG

## 2023-07-06 VITALS — BODY MASS INDEX: 50 KG/M2

## 2023-07-06 DIAGNOSIS — R10.9: Primary | ICD-10-CM

## 2023-07-06 LAB
ADD MANUAL DIFF: NO
ALANINE AMINOTRANSFERASE: 32 U/L (ref 16–63)
ALBUMIN GLOBULIN RATIO: 0.7
ALBUMIN LEVEL: 3.1 G/DL (ref 3.4–5)
ALKALINE PHOSPHATASE: 168 U/L (ref 46–116)
AMYLASE: 13 U/L (ref 25–115)
ANION GAP: 13.7
ASPARTATE AMINO TRANSFERASE: 38 U/L (ref 15–37)
BLOOD UREA NITROGEN: 11 MG/DL (ref 7–18)
CALCIUM: 8.1 MG/DL (ref 8.5–10.1)
CARBON DIOXIDE: 25.6 MMOL/L (ref 21–32)
CHLORIDE: 103 MMOL/L (ref 98–107)
CO2 BLD-SCNC: 25.6 MMOL/L (ref 21–32)
ESTIMATED GFR (AFRICAN AMERICA: >60 (ref 60–?)
ESTIMATED GFR (NON-AFRICAN AME: >60 (ref 60–?)
GLOBULIN: 4.2 G/DL
GLUCOSE BLD-MCNC: 86 MG/DL (ref 74–106)
HCT VFR BLD CALC: 38.1 % (ref 42–54)
HEMATOCRIT: 38.1 % (ref 42–54)
HEMOGLOBIN: 12.9 G/DL (ref 14–18)
IMMATURE GRANULOCYTES ABS AUTO: 0 10^3/UL (ref 0–0.03)
IMMATURE GRANULOCYTES PCT AUTO: 0 % (ref 0–0.5)
LIPASE: 36 U/L (ref 73–393)
LYMPHOCYTES  ABSOLUTE AUTO: 1.6 10^3/UL (ref 1.2–3.8)
MCV RBC: 90.7 FL (ref 80–94)
MEAN CORPUSCULAR HEMOGLOBIN: 30.7 PG (ref 25.9–34)
MEAN CORPUSCULAR HGB CONC: 33.9 G/DL (ref 29.9–35.2)
MEAN CORPUSCULAR VOLUME: 90.7 FL (ref 80–94)
PLATELET # BLD: 135 10^3/UL (ref 150–450)
PLATELET COUNT: 135 10^3/UL (ref 150–450)
POTASSIUM SERPLBLD-SCNC: 3.3 MMOL/L (ref 3.5–5.1)
POTASSIUM: 3.3 MMOL/L (ref 3.5–5.1)
RED BLOOD COUNT: 4.2 10^6/UL (ref 4.7–6.1)
SODIUM BLD-SCNC: 139 MMOL/L (ref 136–145)
SODIUM: 139 MMOL/L (ref 136–145)
TOTAL PROTEIN: 7.3 G/DL (ref 6.4–8.2)
WBC # BLD: 4 10^3/UL (ref 4–11)
WHITE BLOOD COUNT: 4 10^3/UL (ref 4–11)

## 2023-07-06 PROCEDURE — 83690 ASSAY OF LIPASE: CPT

## 2023-07-06 PROCEDURE — 80048 BASIC METABOLIC PNL TOTAL CA: CPT

## 2023-07-06 PROCEDURE — 80076 HEPATIC FUNCTION PANEL: CPT

## 2023-07-06 PROCEDURE — 76705 ECHO EXAM OF ABDOMEN: CPT

## 2023-07-06 PROCEDURE — 99284 EMERGENCY DEPT VISIT MOD MDM: CPT

## 2023-07-06 PROCEDURE — 82150 ASSAY OF AMYLASE: CPT

## 2023-07-06 PROCEDURE — 85025 COMPLETE CBC W/AUTO DIFF WBC: CPT

## 2023-07-06 PROCEDURE — 36415 COLL VENOUS BLD VENIPUNCTURE: CPT

## 2023-07-06 NOTE — US_ITS
The [address] [address] 
     [phone] 
  
  
Patient Name: 
MIN GOLD 
  
MRN: [id number]    YOB: 1990    Sex: M 
Assigned Patient Location: ER 
Current Patient Location: ER 
Accession/Order Number: [id number] 
Exam Date: 7/06/2023  08:35    Report Date: 7/06/2023  09:16 
  
At the request of: 
ALEX WILD   
  
Procedure:  US right upper quadrant 
  
US right upper quadrant, 7/6/2023 8:35 AM EDT 
  
INDICATION:Right upper quadrant pain for months 
  
COMPARISON: Right upper quadrant ultrasound 4/17/2023 
  
TECHNIQUE: Multi-planar real-time ultrasonography of the upper abdomen (right  
upper quadrant) using grayscale imaging, supplemented by color, power, and  
spectral Doppler as needed. 
  
FINDINGS: 
The visualized pancreas is unremarkable. 
The pancreatic tail is obscured by overlying bowel gas. 
  
The liver is 18.2 cm with coarse increased echotexture and micronodular  
contour. 
No intrahepatic ductal dilatation. 
Common bile duct 5.5mm 
Normal gallbladder. No gallbladder wall thickening or pericholecystic fluid.  
Negative sonographic Durbin's sign. 
The main portal vein is antegrade with normal low resistance venous spectral  
tracing. 
  
Right kidney: 11.5 x 5.3 x 5.2 cm. 
No hydronephrosis. 
Normal color Doppler to the right kidney. 
  
No ascites. 
  
IMPRESSION: 
  
1. No acute findings. 
  
2. Hepatomegaly with findings suggestive of liver cirrhosis. 
  
  
Electronically authenticated by: LILLY HARE   Date: 7/06/2023  09:16

## 2023-07-06 NOTE — ED_ITS
HPI - Abdominal Pain    
General    
Chief Complaint: Abdominal Pain    
Stated Complaint: ABDOMINAL PAIN    
Time Seen by Provider: 07/06/23 07:55    
Source: patient    
Mode of arrival: walk-in    
History of Present Illness    
HPI narrative:     
33-year-old male presents for epigastric pain. It started last night. He was   
told months ago that he needed to get his gallbladder taken out but he didn't   
follow through with it. He's had this pain and it's moderate and he's been   
nauseous and vomiting. No fever or trauma. Symptoms are continuous.    
Related Data    
                                  Previous Rx's    
    
    
    
 Medication  Instructions  Recorded    
     
esomeprazole magnesium 20 mg 20 mg PO DAILY #14 caps 07/06/23    
    
capsule,delayed release (Nexium)      
    
    
    
                                    Allergies    
    
    
    
Allergy/AdvReac Type Severity Reaction Status Date / Time    
     
No Known Drug Allergies Allergy   Verified 07/06/23 08:00    
    
    
    
    
Review of Systems    
    
    
ROS      
    
 Narrative A ten point review of systems is negative except as noted above.       
    
 Gastrointestinal Reports: abdominal pain, nausea and vomiting       
    
    
Exam    
Narrative    
Exam Narrative:     
Nurses note and vital signs reviewed and patient is not hypoxic.    
    
General:  The patient appears uncomfortable    
Skin:  Warm, dry, no pallor noted.  There is no rash noted.    
Head:  Normocephalic, atraumatic    
Eye: Normal conjunctiva, no drainage    
Ears, Nose, Mouth, and Throat: oral mucosa is moist. Nares patent.     
Cardiovascular:  Regular Rate and Rhythm    
Respiratory:  Patient is in no distress, no accessory muscle use, lungs are   
clear to auscultation, no wheezing, rales or rhonchi    
Back:  non-tender    
GI:  mild tenderness present in the epigastric and right upper quadrant areas..    
Musculoskeletal: The patient has no evidence of calf tenderness, no pitting   
edema, symmetrical pulses noted bilaterally    
Neurological:  A&O, normal speech    
Psychiatric:  Cooperative    
Constitutional    
    
                               Vital Signs - 24 hr    
    
    
    
 07/06/23    
07:57    
     
Temperature 97.6 F    
     
Pulse Rate [Monitor] 65    
     
Respiratory Rate 20    
     
Blood Pressure [Left Arm] 158/91 H    
     
Pulse Oximetry 100    
     
Oxygen Delivery Method Room Air    
    
    
    
    
    
Course    
Vital Signs    
Vital signs:     
    
                                   Vital Signs    
    
    
    
Temperature  97.6 F   07/06/23 07:57    
     
Pulse Rate  65   07/06/23 07:57    
     
Respiratory Rate  20   07/06/23 07:57    
     
Blood Pressure  158/91 H  07/06/23 07:57    
     
Pulse Oximetry  100   07/06/23 07:57    
     
Oxygen Delivery Method  Room Air  07/06/23 07:57    
    
    
                                            
    
    
    
Temperature  97.6 F   07/06/23 07:57    
     
Pulse Rate  65   07/06/23 07:57    
     
Respiratory Rate  20   07/06/23 07:57    
     
Blood Pressure  158/91 H  07/06/23 07:57    
     
Pulse Oximetry  100   07/06/23 07:57    
     
Oxygen Delivery Method  Room Air  07/06/23 07:57    
    
    
    
    
    
MDM - Abdominal Pain    
MDM Narrative    
Medical decision making narrative:     
the patient's workup including gallbladder ultrasound is negative. She doesn't   
require admission the hospital and is going to be discharged home on Nexium.   
He'll follow-up with his PCP. Treatment diagnosis and follow-up were discussed   
with the patient.    
Differential Diagnosis    
Differential diagnosis: Likely abdominal pain, constipation, gastroenteritis,   
pancreatitis and small bowel obstruction    
Lab Data    
Attestation: I reviewed the patient's lab results.    
Labs:     
    
                                   Lab Results    
    
    
    
  07/06/23 Range/Units    
    
  08:27     
     
WBC  4.0  (4.0-11.0)  10^3/uL    
     
RBC  4.20 L  (4.70-6.10)  10^6/uL    
     
Hgb  12.9 L  (14.0-18.0)  g/dL    
     
Hct  38.1 L  (42.0-54.0)  %    
     
MCV  90.7  (80.0-94.0)  fL    
     
MCH  30.7  (25.9-34.0)  pg    
     
MCHC  33.9  (29.9-35.2)  g/dL    
     
RDW  13.6  (11.0-15.0)  %    
     
Plt Count  135 L  (150-450)  10^3/uL    
     
MPV  11.0  (9.5-13.5)  fL    
     
Neut % (Auto)  38.0 L  (43.0-75.0)  %    
     
Lymph % (Auto)  40.6  (20.5-60.0)  %    
     
Mono % (Auto)  10.6  (1.7-12.0)  %    
     
Eos % (Auto)  10.1 H  (0.9-7.0)  %    
     
Baso % (Auto)  0.7  (0.2-2.0)  %    
     
Neut # (Auto)  1.5  (1.4-6.5)  10^3/uL    
     
Lymph # (Auto)  1.6  (1.2-3.8)  10^3/uL    
     
Mono # (Auto)  0.4  (0.3-0.8)  10^3/uL    
     
Eos # (Auto)  0.4  (0.0-0.7)  10^3/uL    
     
Baso # (Auto)  0.0  (0.0-0.1)  10^3/uL    
     
Abs Immat Gran (auto)  0.00  (0.00-0.03)  10^3/uL    
     
Imm/Tot Granulo (auto)  0.0  (0.0-0.5)  %    
     
Sodium  139  (136-145)  mmol/L    
     
Potassium  3.3 L  (3.5-5.1)  mmol/L    
     
Chloride  103  ()  mmol/L    
     
Carbon Dioxide  25.6  (21.0-32.0)  mmol/L    
     
Anion Gap  13.7      
     
BUN  11.0  (7.0-18.0)  mg/dL    
     
Creatinine  0.73  (0.70-1.30)  mg/dL    
     
Est GFR ( Amer)  >60  (>=60)      
     
Est GFR (Non-Af Amer)  >60  (>=60)      
     
BUN/Creatinine Ratio  15.1      
     
Glucose  86  ()  mg/dL    
     
Calcium  8.1 L  (8.5-10.1)  mg/dL    
     
Total Bilirubin  1.1 H  (0.2-1.0)  mg/dL    
     
Direct Bilirubin  0.3 H  (0.0-0.2)  mg/dL    
     
AST  38 H  (15-37)  U/L    
     
ALT  32  (16-63)  U/L    
     
Alkaline Phosphatase  168 H  ()  U/L    
     
Total Protein  7.3  (6.4-8.2)  g/dL    
     
Albumin  3.1 L  (3.4-5.0)  g/dL    
     
Globulin  4.2  g/dL    
     
Albumin/Globulin Ratio  0.7      
     
Amylase  13 L  ()  U/L    
     
Lipase  36.0 L  (73.0-393.0)  U/L    
    
    
    
    
    
Discharge Plan    
Discharge    
Chief Complaint: Abdominal Pain    
    
Clinical Impression:    
 Abdominal pain    
    
    
Patient Disposition: Home, Self-Care    
    
Time of Disposition Decision: 09:29    
    
Condition: Good    
    
Mode of Transportation: Private Vehicle    
    
Prescriptions / Home Meds:    
New    
  esomeprazole magnesium [Nexium] 20 mg capsule,delayed release(DR/EC)     
   20 mg PO DAILY Qty: 14 0RF    
    
Instructions:  GERD (Gastroesophageal Reflux Disease) (ED), Abdominal Pain (ED)    
    
Stand Alone Forms:  Portal Instructions    
    
Referrals:    
Physician,Non-Staff, MD [Primary Care Provider] - 1 week

## 2024-04-11 ENCOUNTER — HOSPITAL ENCOUNTER (EMERGENCY)
Age: 34
Discharge: HOME | End: 2024-04-12
Attending: EMERGENCY MEDICINE
Payer: COMMERCIAL

## 2024-04-11 DIAGNOSIS — L03.115 CELLULITIS OF RIGHT KNEE: Primary | ICD-10-CM

## 2024-04-11 DIAGNOSIS — K02.9 DENTAL CARIES: ICD-10-CM

## 2024-04-11 PROCEDURE — 99283 EMERGENCY DEPT VISIT LOW MDM: CPT

## 2024-04-11 ASSESSMENT — PAIN - FUNCTIONAL ASSESSMENT: PAIN_FUNCTIONAL_ASSESSMENT: NONE - DENIES PAIN

## 2024-04-12 VITALS
OXYGEN SATURATION: 96 % | SYSTOLIC BLOOD PRESSURE: 148 MMHG | DIASTOLIC BLOOD PRESSURE: 85 MMHG | RESPIRATION RATE: 18 BRPM | TEMPERATURE: 97.8 F | HEART RATE: 62 BPM

## 2024-04-12 PROCEDURE — 6370000000 HC RX 637 (ALT 250 FOR IP): Performed by: STUDENT IN AN ORGANIZED HEALTH CARE EDUCATION/TRAINING PROGRAM

## 2024-04-12 RX ORDER — CEPHALEXIN 250 MG/1
500 CAPSULE ORAL ONCE
Status: COMPLETED | OUTPATIENT
Start: 2024-04-12 | End: 2024-04-12

## 2024-04-12 RX ORDER — CEPHALEXIN 500 MG/1
500 CAPSULE ORAL 4 TIMES DAILY
Qty: 28 CAPSULE | Refills: 0 | Status: SHIPPED | OUTPATIENT
Start: 2024-04-12 | End: 2024-04-19

## 2024-04-12 RX ADMIN — MUPIROCIN: 20 OINTMENT TOPICAL at 00:21

## 2024-04-12 RX ADMIN — CEPHALEXIN 500 MG: 250 CAPSULE ORAL at 00:20

## 2024-04-12 ASSESSMENT — ENCOUNTER SYMPTOMS
FACIAL SWELLING: 0
TROUBLE SWALLOWING: 0
SHORTNESS OF BREATH: 0
VOICE CHANGE: 0
ABDOMINAL PAIN: 0

## 2024-04-12 NOTE — ED NOTES
Pt to ED 5 via triage c/o bilateral knee pain and left upper dental pain. Pt states he cracked a tooth and is supposed to be having it pulled but the appointment is not for a few weeks. Pt states he tripped and fell three days or so ago and scraped both of his knees, he has been treating with neosporin but now there is a green/yellow discharge coming from them. Pt states dental pain is 10/10 and knee pain is 5/10. Pt RR is even and non-labored on arrival, resident is at the bedside to assess, plan of care ongoing.

## 2024-04-12 NOTE — ED PROVIDER NOTES
[facility]     Emergency Department     Faculty Attestation    I performed a history and physical examination of the patient and discussed management with the resident. I reviewed the resident’s note and agree with the documented findings including all diagnostic interpretations and plan of care. Any areas of disagreement are noted on the chart. I was personally present for the key portions of any procedures. I have documented in the chart those procedures where I was not present during the key portions. I have reviewed the emergency nurses triage note. I agree with the chief complaint, past medical history, past surgical history, allergies, medications, social and family history as documented unless otherwise noted below. Documentation of the HPI, Physical Exam and Medical Decision Making performed by chandrakant is based on my personal performance of the HPI, PE and MDM. For Physician Assistant/ Nurse Practitioner cases/documentation I have personally evaluated this patient and have completed at least one if not all key elements of the E/M (history, physical exam, and MDM). Additional findings are as noted.    Primary Care Physician: No primary care provider on file.    Note Started: 3:03 AM EDT     VITAL SIGNS:   oral temperature is 97.8 °F (36.6 °C). His blood pressure is 148/85 (abnormal) and his pulse is 62. His respiration is 18 and oxygen saturation is 96%.      Medical Decision Making  Concern for infection of abrasion sustained several days ago.  History of PVD.  Has noted some yellow somewhat thick drainage from the right knee abrasion site.  On exam there is some surrounding erythema there is some fibrinous tissue but there is also some yellow somewhat purulent appearing tissue as well concerning for early infection.  Keflex, mupirocin.    Risk  Prescription drug management.          Noble Epps MD, FACEP, FAAEM  Attending Emergency Physician

## 2024-04-12 NOTE — ED PROVIDER NOTES
[facility] ED  Emergency Department Encounter  Emergency Medicine Resident     Pt Name:Raoul Healy  MRN: [id number]  Birthdate 1990  Date of evaluation: 24  PCP:  No primary care provider on file.  Note Started: 11:42 PM EDT    CHIEF COMPLAINT       Chief Complaint   Patient presents with    Knee Pain    Dental Pain     Left     HISTORY OF PRESENT ILLNESS  (Location/Symptom, Timing/Onset, Context/Setting, Quality, Duration, Modifying Factors, Severity.)      Raoul Healy is a 33 y.o. male who presents with bilateral knee pain, right greater than left after a fall sustained 2 to 3 days ago.  Patient states that he has been living at the [facility] and they are concerned for infection at his knees as he has developed thick yellow discharge from the abrasions.  Patient has full range of motion of the knees and they are nontender aside from at the site of the abrasion.  He also reports that he has been having left upper molar pain.  He is scheduled to have this tooth extracted at his dentist in approximately 2 weeks.  He denies any fevers or facial swelling.  Has been tolerating oral intake.  No difficulty swallowing or change of voice.    Prior admissions for cellulitis in lower extremities in 2023    PAST MEDICAL / SURGICAL / SOCIAL / FAMILY HISTORY      has a past medical history of Hepatitis C, Hepatitis C, Heroin abuse (HCC), HTN (hypertension), and Morbid obesity (HCC).     has a past surgical history that includes [place] tooth extraction.    Social History     Socioeconomic History    Marital status: Single     Spouse name: Not on file    Number of children: Not on file    Years of education: Not on file    Highest education level: Not on file   Occupational History    Not on file   Tobacco Use    Smoking status: Every Day     Current packs/day: 0.00     Types: Cigarettes     Last attempt to quit: 2012     Years since quittin.3    Smokeless tobacco: Never

## 2024-04-12 NOTE — ED NOTES
MARIO consulted to assist pt reporting he is staying at [facility]. Pt had [facility] paperwork with him showing he was a patient there. Sw called [facility] and informed pt was ready for discharge and they report they will call transportation for pt to return. Per request of nurse at [facility] please send pt with extra gauze. RN informed.

## 2024-05-24 ENCOUNTER — APPOINTMENT (OUTPATIENT)
Dept: CT IMAGING | Age: 34
End: 2024-05-24
Payer: COMMERCIAL

## 2024-05-24 ENCOUNTER — HOSPITAL ENCOUNTER (EMERGENCY)
Age: 34
Discharge: HOME | End: 2024-05-24
Attending: EMERGENCY MEDICINE
Payer: COMMERCIAL

## 2024-05-24 VITALS
WEIGHT: 315 LBS | HEART RATE: 51 BPM | BODY MASS INDEX: 37.19 KG/M2 | TEMPERATURE: 97 F | OXYGEN SATURATION: 98 % | DIASTOLIC BLOOD PRESSURE: 80 MMHG | RESPIRATION RATE: 18 BRPM | SYSTOLIC BLOOD PRESSURE: 143 MMHG | HEIGHT: 77 IN

## 2024-05-24 DIAGNOSIS — N30.00 ACUTE CYSTITIS WITHOUT HEMATURIA: Primary | ICD-10-CM

## 2024-05-24 LAB
ALBUMIN SERPL-MCNC: 3.9 G/DL (ref 3.5–5.2)
ALBUMIN/GLOB SERPL: 2 {RATIO} (ref 1–2.5)
ALP SERPL-CCNC: 113 U/L (ref 40–129)
ALT SERPL-CCNC: 29 U/L (ref 10–50)
ANION GAP SERPL CALCULATED.3IONS-SCNC: 11 MMOL/L (ref 9–16)
AST SERPL-CCNC: 32 U/L (ref 10–50)
BASOPHILS # BLD: 0.04 K/UL (ref 0–0.2)
BASOPHILS NFR BLD: 1 % (ref 0–2)
BILIRUB SERPL-MCNC: 0.4 MG/DL (ref 0–1.2)
BILIRUB UR QL STRIP: NEGATIVE
BUN SERPL-MCNC: 16 MG/DL (ref 6–20)
CALCIUM SERPL-MCNC: 8.3 MG/DL (ref 8.6–10.4)
CASTS #/AREA URNS LPF: NORMAL /LPF (ref 0–2)
CASTS #/AREA URNS LPF: NORMAL /LPF (ref 0–2)
CHLORIDE SERPL-SCNC: 106 MMOL/L (ref 98–107)
CLARITY UR: ABNORMAL
CO2 SERPL-SCNC: 24 MMOL/L (ref 20–31)
COLOR UR: YELLOW
CREAT SERPL-MCNC: 0.7 MG/DL (ref 0.7–1.2)
EOSINOPHIL # BLD: 0.28 K/UL (ref 0–0.44)
EOSINOPHILS RELATIVE PERCENT: 8 % (ref 1–4)
EPI CELLS #/AREA URNS HPF: NORMAL /HPF (ref 0–5)
ERYTHROCYTE [DISTWIDTH] IN BLOOD BY AUTOMATED COUNT: 12.9 % (ref 11.8–14.4)
GFR, ESTIMATED: >90 ML/MIN/1.73M2
GLUCOSE SERPL-MCNC: 108 MG/DL (ref 74–99)
GLUCOSE UR STRIP-MCNC: NEGATIVE MG/DL
HCT VFR BLD AUTO: 38.4 % (ref 40.7–50.3)
HGB BLD-MCNC: 13 G/DL (ref 13–17)
HGB UR QL STRIP.AUTO: ABNORMAL
IMM GRANULOCYTES # BLD AUTO: <0.03 K/UL (ref 0–0.3)
IMM GRANULOCYTES NFR BLD: 0 %
KETONES UR STRIP-MCNC: NEGATIVE MG/DL
LEUKOCYTE ESTERASE UR QL STRIP: ABNORMAL
LIPASE SERPL-CCNC: 17 U/L (ref 13–60)
LYMPHOCYTES NFR BLD: 1.45 K/UL (ref 1.1–3.7)
LYMPHOCYTES RELATIVE PERCENT: 41 % (ref 24–43)
MCH RBC QN AUTO: 29.6 PG (ref 25.2–33.5)
MCHC RBC AUTO-ENTMCNC: 33.9 G/DL (ref 28.4–34.8)
MCV RBC AUTO: 87.5 FL (ref 82.6–102.9)
MONOCYTES NFR BLD: 0.22 K/UL (ref 0.1–1.2)
MONOCYTES NFR BLD: 6 % (ref 3–12)
MUCOUS THREADS URNS QL MICRO: NORMAL
NEUTROPHILS NFR BLD: 44 % (ref 36–65)
NEUTS SEG NFR BLD: 1.54 K/UL (ref 1.5–8.1)
NITRITE UR QL STRIP: NEGATIVE
NRBC BLD-RTO: 0 PER 100 WBC
PH UR STRIP: 5.5 [PH] (ref 5–8)
PLATELET # BLD AUTO: ABNORMAL K/UL (ref 138–453)
PLATELET, FLUORESCENCE: 101 K/UL (ref 138–453)
PLATELETS.RETICULATED NFR BLD AUTO: 5.9 % (ref 1.1–10.3)
POTASSIUM SERPL-SCNC: 3.9 MMOL/L (ref 3.7–5.3)
PROT SERPL-MCNC: 6.4 G/DL (ref 6.6–8.7)
PROT UR STRIP-MCNC: NEGATIVE MG/DL
RBC # BLD AUTO: 4.39 M/UL (ref 4.21–5.77)
RBC #/AREA URNS HPF: NORMAL /HPF (ref 0–2)
SODIUM SERPL-SCNC: 141 MMOL/L (ref 136–145)
SP GR UR STRIP: 1.03 (ref 1–1.03)
UROBILINOGEN UR STRIP-ACNC: NORMAL EU/DL (ref 0–1)
WBC #/AREA URNS HPF: NORMAL /HPF (ref 0–5)
WBC OTHER # BLD: 3.5 K/UL (ref 3.5–11.3)

## 2024-05-24 PROCEDURE — 74176 CT ABD & PELVIS W/O CONTRAST: CPT

## 2024-05-24 PROCEDURE — 6360000002 HC RX W HCPCS

## 2024-05-24 PROCEDURE — 80053 COMPREHEN METABOLIC PANEL: CPT

## 2024-05-24 PROCEDURE — 83690 ASSAY OF LIPASE: CPT

## 2024-05-24 PROCEDURE — 85055 RETICULATED PLATELET ASSAY: CPT

## 2024-05-24 PROCEDURE — 96374 THER/PROPH/DIAG INJ IV PUSH: CPT

## 2024-05-24 PROCEDURE — 6370000000 HC RX 637 (ALT 250 FOR IP)

## 2024-05-24 PROCEDURE — 81001 URINALYSIS AUTO W/SCOPE: CPT

## 2024-05-24 PROCEDURE — 99284 EMERGENCY DEPT VISIT MOD MDM: CPT

## 2024-05-24 PROCEDURE — 85025 COMPLETE CBC W/AUTO DIFF WBC: CPT

## 2024-05-24 PROCEDURE — 87086 URINE CULTURE/COLONY COUNT: CPT

## 2024-05-24 RX ORDER — CEPHALEXIN 500 MG/1
500 CAPSULE ORAL ONCE
Status: COMPLETED | OUTPATIENT
Start: 2024-05-24 | End: 2024-05-24

## 2024-05-24 RX ORDER — LIDOCAINE 4 G/G
1 PATCH TOPICAL DAILY
Status: DISCONTINUED | OUTPATIENT
Start: 2024-05-24 | End: 2024-05-24 | Stop reason: HOSPADM

## 2024-05-24 RX ORDER — CEPHALEXIN 500 MG/1
500 CAPSULE ORAL 2 TIMES DAILY
Qty: 14 CAPSULE | Refills: 0 | Status: SHIPPED | OUTPATIENT
Start: 2024-05-24 | End: 2024-05-31

## 2024-05-24 RX ORDER — METHOCARBAMOL 500 MG/1
750 TABLET, FILM COATED ORAL ONCE
Status: COMPLETED | OUTPATIENT
Start: 2024-05-24 | End: 2024-05-24

## 2024-05-24 RX ORDER — KETOROLAC TROMETHAMINE 15 MG/ML
15 INJECTION, SOLUTION INTRAMUSCULAR; INTRAVENOUS ONCE
Status: COMPLETED | OUTPATIENT
Start: 2024-05-24 | End: 2024-05-24

## 2024-05-24 RX ORDER — OXYCODONE HYDROCHLORIDE 5 MG/1
5 TABLET ORAL ONCE
Status: COMPLETED | OUTPATIENT
Start: 2024-05-24 | End: 2024-05-24

## 2024-05-24 RX ORDER — ACETAMINOPHEN 500 MG
1000 TABLET ORAL ONCE
Status: COMPLETED | OUTPATIENT
Start: 2024-05-24 | End: 2024-05-24

## 2024-05-24 RX ADMIN — KETOROLAC TROMETHAMINE 15 MG: 15 INJECTION, SOLUTION INTRAMUSCULAR; INTRAVENOUS at 17:00

## 2024-05-24 RX ADMIN — CEPHALEXIN 500 MG: 500 CAPSULE ORAL at 18:56

## 2024-05-24 RX ADMIN — METHOCARBAMOL 750 MG: 500 TABLET ORAL at 17:00

## 2024-05-24 RX ADMIN — OXYCODONE HYDROCHLORIDE 5 MG: 5 TABLET ORAL at 18:56

## 2024-05-24 RX ADMIN — ACETAMINOPHEN 1000 MG: 500 TABLET ORAL at 17:00

## 2024-05-24 ASSESSMENT — PAIN SCALES - GENERAL
PAINLEVEL_OUTOF10: 10
PAINLEVEL_OUTOF10: 9

## 2024-05-24 ASSESSMENT — PAIN DESCRIPTION - DESCRIPTORS: DESCRIPTORS: STABBING

## 2024-05-24 ASSESSMENT — PAIN DESCRIPTION - LOCATION
LOCATION: BACK
LOCATION: ABDOMEN

## 2024-05-24 ASSESSMENT — PAIN - FUNCTIONAL ASSESSMENT: PAIN_FUNCTIONAL_ASSESSMENT: 0-10

## 2024-05-24 NOTE — ED PROVIDER NOTES
[facility] ED     Emergency Department     Faculty Attestation    I performed a history and physical examination of the patient and discussed management with the resident. I reviewed the resident’s note and agree with the documented findings and plan of care. Any areas of disagreement are noted on the chart. I was personally present for the key portions of any procedures. I have documented in the chart those procedures where I was not present during the key portions. I have reviewed the emergency nurses triage note. I agree with the chief complaint, past medical history, past surgical history, allergies, medications, social and family history as documented unless otherwise noted below. For Physician Assistant/ Nurse Practitioner cases/documentation I have personally evaluated this patient and have completed at least one if not all key elements of the E/M (history, physical exam, and MDM). Additional findings are as noted.    Note Started: 4:36 PM EDT    Patient here with urinary complaints back pain.  Remote history of stones but also states has had urinary tract infections in the past.  Denies any testicular pain no penile discharge no STD concerns.  Some nausea but no vomiting no injury no trauma.  On exam peers uncomfortable nontoxic abdomen soft does have suprapubic tenderness no focal CVA tenderness just mild diffuse back pain.  Will check labs urine plan for CT      Critical Care     none    Renard Pedroza MD, FACEP, FAAEM  Attending Emergency  Physician           Renard Pedroza MD  05/24/24 5826

## 2024-05-24 NOTE — ED NOTES
Pt A&O x 4, does not appear in acute distress, RR even and unlabored, resting comfortably on stretcher with eyes closed. Pt voices no other concerns at this time. Continuing with current POC.

## 2024-05-24 NOTE — ED NOTES
Pt presents to the ED c/o abdominal pain, lower back pain, dysuria, nausea and vomiting that started yesterday.  Pt denies any CP or SOB.  Pt A&O x 4, does not appear in acute distress, but is pale and diaphoretic, RR even and unlabored.  Pt sitting in a chair with eyes open.   Vital signs obtained, medical hx and allergies reviewed with pt.   Pt placed in a gown for exam.  Triage completed.

## 2024-05-24 NOTE — ED PROVIDER NOTES
[facility]  Emergency Department  Emergency Medicine Resident Sign-out     Care of Raoul Healy was assumed from Dr. Moe and is being seen for Dysuria and Back Pain  .  The patient's initial evaluation and plan have been discussed with the prior provider who initially evaluated the patient.     EMERGENCY DEPARTMENT COURSE / MEDICAL DECISION MAKING:       MEDICATIONS GIVEN:  Orders Placed This Encounter   Medications    ketorolac (TORADOL) injection 15 mg    DISCONTD: lidocaine 4 % external patch 1 patch    acetaminophen (TYLENOL) tablet 1,000 mg    methocarbamol (ROBAXIN) tablet 750 mg    oxyCODONE (ROXICODONE) immediate release tablet 5 mg    cephALEXin (KEFLEX) capsule 500 mg     Order Specific Question:   Antimicrobial Indications     Answer:   Urinary Tract Infection    cephALEXin (KEFLEX) 500 MG capsule     Sig: Take 1 capsule by mouth 2 times daily for 7 days     Dispense:  14 capsule     Refill:  0       LABS / RADIOLOGY:     Results for orders placed or performed during the hospital encounter of 05/24/24   Urinalysis with Reflex to Culture    Specimen: Urine   Result Value Ref Range    Color, UA Yellow Yellow    Turbidity UA Cloudy (A) Clear    Glucose, Ur NEGATIVE NEGATIVE mg/dL    Bilirubin, Urine NEGATIVE NEGATIVE    Ketones, Urine NEGATIVE NEGATIVE mg/dL    Specific Gravity, UA 1.027 1.005 - 1.030    Urine Hgb LARGE (A) NEGATIVE    pH, Urine 5.5 5.0 - 8.0    Protein, UA NEGATIVE NEGATIVE mg/dL    Urobilinogen, Urine Normal 0.0 - 1.0 EU/dL    Nitrite, Urine NEGATIVE NEGATIVE    Leukocyte Esterase, Urine MODERATE (A) NEGATIVE   CBC with Auto Differential   Result Value Ref Range    WBC 3.5 3.5 - 11.3 k/uL    RBC 4.39 4.21 - 5.77 m/uL    Hemoglobin 13.0 13.0 - 17.0 g/dL    Hematocrit 38.4 (L) 40.7 - 50.3 %    MCV 87.5 82.6 - 102.9 fL    MCH 29.6 25.2 - 33.5 pg    MCHC 33.9 28.4 - 34.8 g/dL    RDW 12.9 11.8 - 14.4 %    Platelets See Reflexed IPF Result 138 - 453 k/uL  nephrolithiasis.      OUTSTANDING TASKS / RECOMMENDATIONS:    Follow-up lab work and imaging  Reevaluate  Dispo     FINAL IMPRESSION:     1. Acute cystitis without hematuria        DISPOSITION:       ED Course as of 05/24/24 2346   Fri May 24, 2024   1722 Leukocyte Esterase, Urine(!): MODERATE [GI]      ED Course User Index  [GI] Marcelo Moe DO     CT imaging showing severe splenomegaly as well as atrophic hepatic lobe and pancreas.  Upon looking back in past 2 CT abdomen pelvis, this appears consistent with those reads.  No anemia or abnormal white cell count.  Slight thrombocytopenia.  Upon reevaluation patient resting comfortably, no acute distress.  Discussed results with patient.  Patient states that he is aware of the CT findings.  Is following up with GI within the next 2 weeks for these findings.  Patient given prescription for Keflex.  Encouraged patient to follow-up with PCP and GI.  Given return precautions.  Patient discharged home.  Patient understands and agrees with the plan.    DISPOSITION:  [x]  Home   []  Nursing Facility   []  Transfer -    []  Admission -     FOLLOW-UP: [facility] ED  [address] [facility] [id number] [phone]  Go to   If symptoms worsen    Luan Chow Jr., MD  [address] [place] [address]  [phone]    Schedule an appointment as soon as possible for a visit        DISCHARGE MEDICATIONS: Discharge Medication List as of 5/24/2024  9:07 PM        START taking these medications    Details   cephALEXin (KEFLEX) 500 MG capsule Take 1 capsule by mouth 2 times daily for 7 days, Disp-14 capsule, R-0Normal                John Marques DO  Emergency Medicine Resident  [facility]

## 2024-05-24 NOTE — ED PROVIDER NOTES
[facility] ED  Emergency Department Encounter  Emergency Medicine Resident     Pt Name:Raoul Healy  MRN: [id number]  Birthdate 1990  Date of evaluation: 24  PCP:  No primary care provider on file.  Note Started: 7:40 PM EDT      CHIEF COMPLAINT       Chief Complaint   Patient presents with    Dysuria    Back Pain       HISTORY OF PRESENT ILLNESS  (Location/Symptom, Timing/Onset, Context/Setting, Quality, Duration, Modifying Factors, Severity.)      Raoul Healy is a 34 y.o. male who presents with dysuria.  Patient states he has had difficulty and pain with urination for the past couple days.  Denies any hematuria.  States he does have back pain that is chronic in nature is midline thoracic lumbar area.  Denies any weakness numbness tingling in his lower extremities.  States he is able to ambulate.  States that he does not use IV drug use no fevers or chills at home.  Denies any nausea or vomiting at this time however he states he did have an episode of emesis earlier today.  He states he is not nauseated right now.  Denies any diarrhea or constipation.  Denies a history of kidney stones.  States he does have a family history of kidney stones.    PAST MEDICAL / SURGICAL / SOCIAL / FAMILY HISTORY      has a past medical history of Hepatitis C, Hepatitis C, Heroin abuse (HCC), HTN (hypertension), and Morbid obesity (HCC).       has a past surgical history that includes [place] tooth extraction.    Social History     Socioeconomic History    Marital status: Single     Spouse name: Not on file    Number of children: Not on file    Years of education: Not on file    Highest education level: Not on file   Occupational History    Not on file   Tobacco Use    Smoking status: Every Day     Current packs/day: 0.00     Types: Cigarettes     Last attempt to quit: 2012     Years since quittin.4    Smokeless tobacco: Never   Vaping Use    Vaping Use: Never used   Substance

## 2024-05-25 LAB
MICROORGANISM SPEC CULT: NO GROWTH
SPECIMEN DESCRIPTION: NORMAL

## 2024-05-25 NOTE — DISCHARGE INSTRUCTIONS
You were here in the emergency department for abdominal pain and burning with urination.  Your lab work did not reveal any acute abnormalities.  Your urinalysis was concerning for urinary tract infection.  You were given antibiotics in the emergency department.  You were given a prescription for antibiotics.  Please take this medication as prescribed and finish the entire course of antibiotics even if you start to feel better.  Your CT scan showed a large spleen and small liver and pancreas.  These appear to be consistent with past CT scans that you have had.  Please follow-up with your primary care physician as well as your GI doctor.  Please follow-up with a urologist.  Please return to the emergency department for any worsening symptoms, questions or concerns.

## 2024-08-01 ENCOUNTER — HOSPITAL ENCOUNTER
Age: 34
Discharge: HOME | End: 2024-08-01
Payer: COMMERCIAL

## 2024-08-01 DIAGNOSIS — B19.10: Primary | ICD-10-CM

## 2024-08-01 LAB
ADD MANUAL DIFF: NO
ALANINE AMINOTRANSFERASE: 30 U/L (ref 16–63)
ALBUMIN GLOBULIN RATIO: 1.2
ALBUMIN LEVEL: 4 G/DL (ref 3.4–5)
ALKALINE PHOSPHATASE: 107 U/L (ref 46–116)
ANION GAP: 16.2
ASPARTATE AMINO TRANSFERASE: 26 U/L (ref 15–37)
BLOOD UREA NITROGEN: 20 MG/DL (ref 7–18)
CALCIUM: 8.9 MG/DL (ref 8.5–10.1)
CARBON DIOXIDE: 23.8 MMOL/L (ref 21–32)
CHLORIDE: 104 MMOL/L (ref 98–107)
CHOLESTEROL: 214 MG/DL (ref ?–200)
CO2 BLD-SCNC: 23.8 MMOL/L (ref 21–32)
ESTIMATED GFR (AFRICAN AMERICA: >60 (ref 60–?)
ESTIMATED GFR (NON-AFRICAN AME: >60 (ref 60–?)
FREE T3: 2.98 PG/ML (ref 2.18–3.98)
GLOBULIN: 3.4 G/DL
GLUCOSE BLD-MCNC: 92 MG/DL (ref 74–106)
HCT VFR BLD CALC: 45.1 % (ref 42–54)
HDL CHOLESTEROL: 55 MG/DL (ref 40–60)
HEMATOCRIT: 45.1 % (ref 42–54)
HEMOGLOBIN: 15.8 G/DL (ref 14–18)
IMMATURE GRANULOCYTES ABS AUTO: 0.03 10^3/UL (ref 0–0.03)
IMMATURE GRANULOCYTES PCT AUTO: 0.7 % (ref 0–0.5)
LYMPHOCYTES  ABSOLUTE AUTO: 1.4 10^3/UL (ref 1.2–3.8)
MCV RBC: 86.2 FL (ref 80–94)
MEAN CORPUSCULAR HEMOGLOBIN: 30.2 PG (ref 25.9–34)
MEAN CORPUSCULAR HGB CONC: 35 G/DL (ref 29.9–35.2)
MEAN CORPUSCULAR VOLUME: 86.2 FL (ref 80–94)
PLATELET # BLD: 127 10^3/UL (ref 150–450)
PLATELET COUNT: 127 10^3/UL (ref 150–450)
POTASSIUM SERPLBLD-SCNC: 4 MMOL/L (ref 3.5–5.1)
POTASSIUM: 4 MMOL/L (ref 3.5–5.1)
RED BLOOD COUNT: 5.23 10^6/UL (ref 4.7–6.1)
SODIUM BLD-SCNC: 140 MMOL/L (ref 136–145)
SODIUM: 140 MMOL/L (ref 136–145)
THYROID STIMULATING HORMONE: 2.71 UIU/ML (ref 0.36–3.74)
TOTAL PROTEIN: 7.4 G/DL (ref 6.4–8.2)
TRIGLYCERIDES: 45 MG/DL (ref ?–150)
VLDL CHOLESTEROL: 9 MG/DL
WBC # BLD: 4.5 10^3/UL (ref 4–11)
WHITE BLOOD COUNT: 4.5 10^3/UL (ref 4–11)

## 2024-08-01 PROCEDURE — 83525 ASSAY OF INSULIN: CPT

## 2024-08-01 PROCEDURE — 36415 COLL VENOUS BLD VENIPUNCTURE: CPT

## 2024-08-01 PROCEDURE — 85025 COMPLETE CBC W/AUTO DIFF WBC: CPT

## 2024-08-01 PROCEDURE — 83036 HEMOGLOBIN GLYCOSYLATED A1C: CPT

## 2024-08-01 PROCEDURE — 80061 LIPID PANEL: CPT

## 2024-08-01 PROCEDURE — 84443 ASSAY THYROID STIM HORMONE: CPT

## 2024-08-01 PROCEDURE — 80053 COMPREHEN METABOLIC PANEL: CPT

## 2024-08-01 PROCEDURE — 84436 ASSAY OF TOTAL THYROXINE: CPT

## 2024-08-01 PROCEDURE — 84481 FREE ASSAY (FT-3): CPT

## 2024-08-01 PROCEDURE — 80074 ACUTE HEPATITIS PANEL: CPT

## 2024-10-24 ENCOUNTER — HOSPITAL ENCOUNTER (EMERGENCY)
Age: 34
Discharge: HOME | End: 2024-10-24
Payer: COMMERCIAL

## 2024-10-24 VITALS
DIASTOLIC BLOOD PRESSURE: 88 MMHG | HEART RATE: 50 BPM | SYSTOLIC BLOOD PRESSURE: 152 MMHG | TEMPERATURE: 98.42 F | OXYGEN SATURATION: 99 %

## 2024-10-24 VITALS — BODY MASS INDEX: 56.3 KG/M2

## 2024-10-24 DIAGNOSIS — R10.9: Primary | ICD-10-CM

## 2024-10-24 DIAGNOSIS — K80.20: ICD-10-CM

## 2024-10-24 DIAGNOSIS — R51.9: ICD-10-CM

## 2024-10-24 LAB
ADD MANUAL DIFF: NO
ALANINE AMINOTRANSFERASE: 21 U/L (ref 16–63)
ALBUMIN GLOBULIN RATIO: 0.9
ALBUMIN LEVEL: 3.6 G/DL (ref 3.4–5)
ALKALINE PHOSPHATASE: 108 U/L (ref 46–116)
ANION GAP: 14.8
ASPARTATE AMINO TRANSFERASE: 16 U/L (ref 15–37)
BLOOD UREA NITROGEN: 18 MG/DL (ref 7–18)
CALCIUM: 9 MG/DL (ref 8.5–10.1)
CARBON DIOXIDE: 26.3 MMOL/L (ref 21–32)
CHLORIDE: 104 MMOL/L (ref 98–107)
CO2 BLD-SCNC: 26.3 MMOL/L (ref 21–32)
ESTIMATED GFR (AFRICAN AMERICA: >60
ESTIMATED GFR (NON-AFRICAN AME: >60
GLOBULIN: 3.9 G/DL
GLUCOSE BLD-MCNC: 102 MG/DL (ref 74–106)
GLUCOSE URINE UA: NEGATIVE MG/DL
HCT VFR BLD CALC: 44.5 % (ref 42–54)
HEMATOCRIT: 44.5 % (ref 42–54)
HEMOGLOBIN: 15.4 G/DL (ref 14–18)
IMMATURE GRANULOCYTES ABS AUTO: 0.04 10^3/UL (ref 0–0.03)
IMMATURE GRANULOCYTES PCT AUTO: 0.5 % (ref 0–0.5)
INR: 1.03
LACTATE/LACTIC ACID: 0.7 MMOL/L (ref 0.4–2)
LIPASE: 15 U/L (ref 16–77)
LYMPHOCYTES  ABSOLUTE AUTO: 1.1 10^3/UL (ref 1.2–3.8)
MCV RBC: 87.1 FL (ref 80–94)
MEAN CORPUSCULAR HEMOGLOBIN: 30.1 PG (ref 25.9–34)
MEAN CORPUSCULAR HGB CONC: 34.6 G/DL (ref 29.9–35.2)
MEAN CORPUSCULAR VOLUME: 87.1 FL (ref 80–94)
PLATELET # BLD: 112 10^3/UL (ref 150–450)
PLATELET COUNT: 112 10^3/UL (ref 150–450)
POTASSIUM SERPLBLD-SCNC: 4.1 MMOL/L (ref 3.5–5.1)
POTASSIUM: 4.1 MMOL/L (ref 3.5–5.1)
PROTHROMBIN TIME: 10.9 SEC (ref 9–11.6)
RED BLOOD COUNT: 5.11 10^6/UL (ref 4.7–6.1)
SODIUM BLD-SCNC: 141 MMOL/L (ref 136–145)
SODIUM: 141 MMOL/L (ref 136–145)
TOTAL PROTEIN: 7.5 G/DL (ref 6.4–8.2)
WBC # BLD: 7.7 10^3/UL (ref 4–11)
WHITE BLOOD COUNT: 7.7 10^3/UL (ref 4–11)

## 2024-10-24 PROCEDURE — 96375 TX/PRO/DX INJ NEW DRUG ADDON: CPT

## 2024-10-24 PROCEDURE — 81003 URINALYSIS AUTO W/O SCOPE: CPT

## 2024-10-24 PROCEDURE — 70450 CT HEAD/BRAIN W/O DYE: CPT

## 2024-10-24 PROCEDURE — 99285 EMERGENCY DEPT VISIT HI MDM: CPT

## 2024-10-24 PROCEDURE — 83605 ASSAY OF LACTIC ACID: CPT

## 2024-10-24 PROCEDURE — 96361 HYDRATE IV INFUSION ADD-ON: CPT

## 2024-10-24 PROCEDURE — 76705 ECHO EXAM OF ABDOMEN: CPT

## 2024-10-24 PROCEDURE — 36415 COLL VENOUS BLD VENIPUNCTURE: CPT

## 2024-10-24 PROCEDURE — 85610 PROTHROMBIN TIME: CPT

## 2024-10-24 PROCEDURE — 84484 ASSAY OF TROPONIN QUANT: CPT

## 2024-10-24 PROCEDURE — 85025 COMPLETE CBC W/AUTO DIFF WBC: CPT

## 2024-10-24 PROCEDURE — 80053 COMPREHEN METABOLIC PANEL: CPT

## 2024-10-24 PROCEDURE — 83690 ASSAY OF LIPASE: CPT

## 2024-10-24 PROCEDURE — 96374 THER/PROPH/DIAG INJ IV PUSH: CPT

## 2024-10-24 PROCEDURE — 93005 ELECTROCARDIOGRAM TRACING: CPT

## 2024-10-24 NOTE — ECG_ITS
[facility] 
                                        
                                       Test Date:    2024-10-24 
Pat Name:     MIN GOLD       Department:    
Patient ID:   [id number]               Room:         - 
Gender:       Male                     Technician:    
:          1990               Requested By:  
Order Number: [id number]              Reading MD:   TARIQ COSBY 
                                 Measurements 
Intervals                              Axis           
Rate:         44                       P:            15 
FL:           160                      QRS:          74 
QRSD:         108                      T:            58 
QT:           476                                     
QTc:          428                                     
                           Interpretive Statements 
1130 Sinus bradycardia 
9140 **  abnormal rhythm ECG  ** 
No previous ECG available for comparison 
Electronically Signed On 10- 6:47:17 EDT by TARIQ COSBY

## 2024-10-24 NOTE — US_ITS
The [address] [address] 
     [phone] 
  
  
Patient Name: 
MIN GOLD 
  
MRN: [id number]    YOB: 1990    Sex: M 
Assigned Patient Location: ER 
Current Patient Location: ER 
Accession/Order Number: [id number] 
Exam Date: 10/24/2024  15:09    Report Date: 10/24/2024  16:12 
  
At the request of: 
ZORAIDA NOYOLA   
  
Procedure:  US right upper quadrant 
  
EXAMINATION: US right upper quadrant  
  
HISTORY: Abdominal pain 
  
COMPARISON: Ultrasound right upper quadrant 7/6/2023 
  
TECHNIQUE: Transabdominal evaluation of the right upper quadrant. 
  
FINDINGS: 
LIVER: Normal size and echotexture. Color Doppler demonstrates patent hepatic  
veins. 
PORTAL VEIN: Duplex Doppler demonstrates normal hepatopetal flow pattern with  
flow velocity averaging 24 cm/s. 
GALLBLADDER: Suspected to be upper limits of normal, 3 mm. Echogenic debris  
versus possibly small stones within the gallbladder. No free fluid. Negative  
sonographic Durbin's sign. 
BILIARY: No abnormal dilation or stones. Common bile duct diameter is within  
normal limits. 
PANCREAS: Not well seen. No appreciable abnormality. 
KIDNEY: No hydronephrosis. No visible mass or stones. Size: 12.4 x 5.7 x 4.7  
cm 
  
ORDER #: [id number] US/US right upper quadrant  
IMPRESSION:   
   
1. Limited examination due to patient body habitus.  
2. No convincing acute abnormality. If there remains clinical concern consider 
  
   
CT abdomen and pelvis with IV and oral contrast.  
   
   
Electronically authenticated by: ZOE DALY   Date: 10/24/2024  16:12

## 2024-10-24 NOTE — ED.GENADUL1
HPI
HPI - General Adult
General
Stated complaint: ABDOMINAL PAIN
Time Seen by Provider: 10/24/24 13:39
Source: patient
Mode of arrival: walk-in
Limitations: no limitations
History of Present Illness
HPI narrative: 
Patient is a 34-year-old male who presents to the emergency department for evaluation of multiple complaints, he states since last night he has had pain in the epigastrium radiating to the right upper quadrant.  He reports associated intermittent 
mild nausea and vomiting.  He denies fevers although his significant other states he may have had a subjective fever earlier today.  Ibuprofen given prior to arrival and the patient arrives afebrile.  He has not had any cough or congestion.  He 
further complains of a bitemporal headache that started this morning, he states he has been having similar headaches for several weeks which is a new symptom for him, he is not known to have chronic migraines.  He has associated light sensitivity 
and states that the headache is  squeezing .  He has had previous umbilical hernia surgery but no other abdominal surgeries.  He denies urinary symptoms, flank or back pain.  No diarrhea.
Related Data
Home Medications

?Medication ?Instructions ?Recorded ?Confirmed
amlodipine 5 mg tablet 5 mg PO DAILY 10/24/24 10/24/24
bupropion HCl 150 mg 24 hr tablet, 300 mg PO BID 10/24/24 10/24/24
extended release   
gabapentin 600 mg tablet 600 mg PO TID 10/24/24 10/24/24
quetiapine 100 mg tablet 200 mg PO BID 10/24/24 10/24/24
simvastatin 20 mg tablet 20 mg PO BEDTIME 10/24/24 10/24/24
trazodone 150 mg tablet 150 mg PO BEDTIME 10/24/24 10/24/24

Previous Rx's

?Medication ?Instructions ?Recorded
butalbital-acetaminophen-caffeine 1 cap PO Q6H PRN headache #12 caps 10/24/24
50 mg-300 mg-40 mg capsule  
(Fioricet)  
famotidine 20 mg tablet (Pepcid) 20 mg PO BID #10 tabs 10/24/24
hyoscyamine sulfate 0.125 mg 0.125 mg PO Q6H PRN abdominal pain 10/24/24
tablet (Levsin) #12 tabs 
ketorolac 10 mg tablet 10 mg PO TID PRN pain #10 tabs 10/24/24
ondansetron 4 mg disintegrating 4 mg PO Q6H PRN nausea and 10/24/24
tablet vomiting #12 tabs 


Allergies

Allergy/AdvReac Type Severity Reaction Status Date / Time
No Known Drug Allergies Allergy   Verified 07/06/23 08:00



Opioid HPI
Opioid Management
Most Recent Opioid Data: 
      Last Pain Scale 8 10/24/24 14:00 10/24/24
      Last MAR Pain Assessment 10/24/24 14:00  


Review of Systems
ROS  
 Constitutional Denies: fever or chills   
 Ears, nose, mouth, and throat Denies: throat pain or nasal congestion   
 Cardiovascular Denies: chest pain   
 Respiratory Denies: shortness of breath   
 Gastrointestinal Reports: abdominal pain, nausea and vomiting; Denies: diarrhea or constipation   
 Genitourinary Denies: painful urination   
 Musculoskeletal Denies: back pain or neck pain   
 Integumentary/Breast Denies: rash   
 Neurological Reports: headache; Denies: numbness in extremities, weakness in extremities or dizziness   
 Endocrine Denies: excessive urination   
 Hematologic/Lymphatic Denies: easy bruising or easy bleeding   

PFSH
PFSH
Social History (Reviewed 10/24/24 @ 13:53 by SUZANNE Avalos)
Little interest or pleasure in doing things:  not at all 
Feeling down, depressed, or hopeless:  not at all 



Exam
Narrative
Exam Narrative: 
Gen.: Awake, alert, in no distress, morbidly obese resting with sunglasses
Head: Normocephalic, atraumatic
ENT: Moist mucous membranes
Respiratory: No respiratory distress, lungs clear bilaterally
Cardio: Regular rate and rhythm
Gastrointestinal: Abdomen is soft, nondistended and tender to palpation in the epigastrium with no guarding or rebound
Extremities: Moves extremities equally, no injuries noted
Psych: Normal mood and affect
Neuro: No focal neuro deficit
Skin: Warm, dry, intact

Constitutional
Vital Signs, click to edit/add: 

Last Vital Signs

Temp  98.5 F   10/24/24 13:27
Pulse  50 L  10/24/24 13:27
Resp  18   10/24/24 13:27
BP  152/88 H  10/24/24 13:27
Pulse Ox  99   10/24/24 13:27
O2 Del Method  Room Air  10/24/24 13:27




Course
Vital Signs
Vital signs: 

Vital Signs

Temperature  98.5 F   10/24/24 13:27
Pulse Rate  50 L  10/24/24 13:27
Respiratory Rate  18   10/24/24 13:27
Blood Pressure  152/88 H  10/24/24 13:27
Pulse Oximetry  99   10/24/24 13:27
Oxygen Delivery Method  Room Air  10/24/24 13:27



Temperature  98.5 F   10/24/24 13:27
Pulse Rate  50 L  10/24/24 13:27
Respiratory Rate  18   10/24/24 13:27
Blood Pressure  152/88 H  10/24/24 13:27
Pulse Oximetry  99   10/24/24 13:27
Oxygen Delivery Method  Room Air  10/24/24 13:27




Medical Decision Making
MDM Narrative
Medical decision making narrative: 
Patient treated with Dilaudid, IV fluids, Zofran, Norflex for abdominal pain and headache.  CT of the brain is unremarkable, laboratory studies reviewed and noted within normal limits.  He has no EKG changes.  Ultrasound of the right upper quadrant 
shows possible debris versus small gallstones with no obvious evidence of acute cholecystitis.  Patient with a negative Durbin sign, no focal tenderness in the right upper quadrant and no laboratory findings concerning for acute cholecystitis.  He 
was given instructions for a gallbladder diet for home, medications for headache and abdominal pain.  He was instructed to follow-up with primary care and general surgery and return to the emergency department if symptoms change or worsen.


SUPERVISED APC VISIT, PHYSICIAN ATTESTATION: 

Based on the medical record the care appears appropriate.

?
Medical Records
Medical records reviewed: Yes I reviewed the patient's medical records
Lab Data
Lab results reviewed: Yes I reviewed the patient's lab results
Labs: 

Lab Results

  10/24/24 10/24/24 Range/Units
  14:00 15:40 
WBC  7.7   (4.0-11.0)  10^3/uL
RBC  5.11   (4.70-6.10)  10^6/uL
Hgb  15.4   (14.0-18.0)  g/dL
Hct  44.5   (42.0-54.0)  %
MCV  87.1   (80.0-94.0)  fL
MCH  30.1   (25.9-34.0)  pg
MCHC  34.6   (29.9-35.2)  g/dL
RDW  12.4   (11.0-15.0)  %
Plt Count  112 L   (150-450)  10^3/uL
MPV  11.2   (9.5-13.5)  fL
Neut % (Auto)  74.8   (43.0-75.0)  %
Lymph % (Auto)  14.1 L   (20.5-60.0)  %
Mono % (Auto)  6.9   (1.7-12.0)  %
Eos % (Auto)  3.4   (0.9-7.0)  %
Baso % (Auto)  0.3   (0.2-2.0)  %
Neut # (Auto)  5.7   (1.4-6.5)  10^3/uL
Lymph # (Auto)  1.1 L   (1.2-3.8)  10^3/uL
Mono # (Auto)  0.5   (0.3-0.8)  10^3/uL
Eos # (Auto)  0.3   (0.0-0.7)  10^3/uL
Baso # (Auto)  0.0   (0.0-0.1)  10^3/uL
Abs Immat Gran (auto)  0.04 H   (0.00-0.03)  10^3/uL
Imm/Tot Granulo (auto)  0.5   (0.0-0.5)  %
PT  10.9   (9.0-11.6)  sec
INR  1.03   
Sodium  141   (136-145)  mmol/L
Potassium  4.1   (3.5-5.1)  mmol/L
Chloride  104   ()  mmol/L
Carbon Dioxide  26.3   (21.0-32.0)  mmol/L
Anion Gap  14.8   
BUN  18.0   (7.0-18.0)  mg/dL
Creatinine  0.78   (0.70-1.30)  mg/dL
Est GFR ( Amer)  >60   (>=60 mL/min/1.73m^2)  
Est GFR (Non-Af Amer)  >60   (>=60 mL/min/1.73m^2)  
BUN/Creatinine Ratio  23.1   
Glucose  102   ()  mg/dL
Lactate  0.7   (0.4-2.0)  mmol/L
Calcium  9.0   (8.5-10.1)  mg/dL
Total Bilirubin  0.9   (0.2-1.0)  mg/dL
AST  16   (15-37)  U/L
ALT  21   (16-63)  U/L
Alkaline Phosphatase  108   ()  U/L
Troponin I High Sens  <4.0 L   (4.0-76.1)  pg/mL
Total Protein  7.5   (6.4-8.2)  g/dL
Albumin  3.6   (3.4-5.0)  g/dL
Globulin  3.9   g/dL
Albumin/Globulin Ratio  0.9   
Lipase  15.0 L   (16.0-77.0)  U/L
Urine Color   Yellow  (YELLOW)  
Urine Clarity   Clear  (CLEAR)  
Urine pH   6.5  (5.0-9.0)  
Ur Specific Gravity   1.020  (1.005-1.025)  
Urine Protein   Negative  (NEG/TRACE)  mg/dL
Urine Glucose (UA)   Negative  (NEGATIVE)  mg/dL
Urine Ketones   Negative  (NEGATIVE)  mg/dL
Urine Occult Blood   Negative  (NEGATIVE)  
Urine Nitrite   Negative  (NEGATIVE)  
Urine Bilirubin   Negative  (NEGATIVE)  
Urine Urobilinogen   1.0  (0.2-1.0)  EU/dL
Ur Leukocyte Esterase   Negative  (NEGATIVE)  



Imaging Data
US - abdomen: 
      Attestation: I have reviewed the pertinent imaging results.
      Radiologist's impression: 

ITS Impressions

Upper Quadrant Ultrasound  10/24/24 13:40
IMPRESSION: 
 
1. Limited examination due to patient body habitus.
2. No convincing acute abnormality. If there remains clinical concern consider 
 
CT abdomen and pelvis with IV and oral contrast.
 
 
Electronically authenticated by: ZOE DALY   Date: 10/24/2024  16:12


Head CT  10/24/24 13:51
IMPRESSION:
1. No acute intracranial abnormality.
 
 
Electronically authenticated by: ALEX GUARDADO   Date: 10/24/2024  14:24




ECG Data
Attestation: I personally reviewed and interpreted this ECG as follows: (Sinus bradycardia at a rate of 44, no acute ST elevation or ectopy.  EKG reviewed by attending physician)

Discharge Plan
Discharge
Clinical Impression:
 Abdominal pain, Gallstones, Headache


Patient Disposition: Home, Self-Care

Time of Disposition Decision: 16:40

Condition: Good

Prescriptions / Home Meds:
New
  butalbital-acetaminophen-caff [Fioricet] -40 mg capsule 
   1 cap PO Q6H PRN (Reason: headache) Qty: 12 0RF
   Rx Instructions:
   DX: R51.9
  ketorolac 10 mg tablet 
   10 mg PO TID PRN (Reason: pain) Qty: 10 0RF
  famotidine [Pepcid] 20 mg tablet 
   20 mg PO BID Qty: 10 0RF
  hyoscyamine sulfate [Levsin] 0.125 mg tablet 
   0.125 mg PO Q6H PRN (Reason: abdominal pain) Qty: 12 0RF
  ondansetron 4 mg tablet,disintegrating 
   4 mg PO Q6H PRN (Reason: nausea and vomiting) Qty: 12 0RF

No Action
  gabapentin 600 mg tablet 
   600 mg PO TID 
  amlodipine 5 mg tablet 
   5 mg PO DAILY 
  quetiapine 100 mg tablet 
   200 mg PO BID 
  simvastatin 20 mg tablet 
   20 mg PO BEDTIME 
  trazodone 150 mg tablet 
   150 mg PO BEDTIME 
  bupropion HCl 150 mg tablet extended release 24 hr 
   300 mg PO BID 

Print Language: English

Instructions:  Gallstones (ED), Acute Headache (ED), Acute Abdominal Pain (ED)

Referrals:
Dion Mallory DO [Physician] - As needed
Physician,Non-Staff, MD [Primary Care Provider] - 1 week

## 2024-10-24 NOTE — ED_ITS
HPI    
HPI - General Adult    
General    
Stated complaint: ABDOMINAL PAIN    
Time Seen by Provider: 10/24/24 13:39    
Source: patient    
Mode of arrival: walk-in    
Limitations: no limitations    
History of Present Illness    
HPI narrative:     
Patient is a 34-year-old male who presents to the emergency department for   
evaluation of multiple complaints, he states since last night he has had pain in  
the epigastrium radiating to the right upper quadrant.  He reports associated   
intermittent mild nausea and vomiting.  He denies fevers although his   
significant other states he may have had a subjective fever earlier today.    
Ibuprofen given prior to arrival and the patient arrives afebrile.  He has not   
had any cough or congestion.  He further complains of a bitemporal headache that  
started this morning, he states he has been having similar headaches for several  
weeks which is a new symptom for him, he is not known to have chronic migraines.  
 He has associated light sensitivity and states that the headache is  squee  
zing .  He has had previous umbilical hernia surgery but no other abdominal   
surgeries.  He denies urinary symptoms, flank or back pain.  No diarrhea.    
Related Data    
                                Home Medications    
    
    
    
?Medication ?Instructions ?Recorded ?Confirmed    
     
amlodipine 5 mg tablet 5 mg PO DAILY 10/24/24 10/24/24    
     
bupropion HCl 150 mg 24 hr tablet, 300 mg PO BID 10/24/24 10/24/24    
    
extended release       
     
gabapentin 600 mg tablet 600 mg PO TID 10/24/24 10/24/24    
     
quetiapine 100 mg tablet 200 mg PO BID 10/24/24 10/24/24    
     
simvastatin 20 mg tablet 20 mg PO BEDTIME 10/24/24 10/24/24    
     
trazodone 150 mg tablet 150 mg PO BEDTIME 10/24/24 10/24/24    
    
    
                                  Previous Rx's    
    
    
    
?Medication ?Instructions ?Recorded    
     
butalbital-acetaminophen-caffeine 1 cap PO Q6H PRN headache #12 caps 10/24/24    
    
50 mg-300 mg-40 mg capsule      
    
(Fioricet)      
     
famotidine 20 mg tablet (Pepcid) 20 mg PO BID #10 tabs 10/24/24    
     
hyoscyamine sulfate 0.125 mg 0.125 mg PO Q6H PRN abdominal pain 10/24/24    
    
tablet (Levsin) #12 tabs     
     
ketorolac 10 mg tablet 10 mg PO TID PRN pain #10 tabs 10/24/24    
     
ondansetron 4 mg disintegrating 4 mg PO Q6H PRN nausea and 10/24/24    
    
tablet vomiting #12 tabs     
    
    
    
                                    Allergies    
    
    
    
Allergy/AdvReac Type Severity Reaction Status Date / Time    
     
No Known Drug Allergies Allergy   Verified 07/06/23 08:00    
    
    
    
    
Opioid HPI    
Opioid Management    
Most Recent Opioid Data:     
    
    
                Last Pain Scale 8               10/24/24 14:00  10/24/24    
     
                          Last MAR Pain Assessment  10/24/24 14:00    
    
    
    
Review of Systems    
    
    
ROS      
    
 Constitutional Denies: fever or chills       
    
 Ears, nose, mouth, and throat Denies: throat pain or nasal congestion       
    
 Cardiovascular Denies: chest pain       
    
 Respiratory Denies: shortness of breath       
    
 Gastrointestinal Reports: abdominal pain, nausea and vomiting; Denies: diarrhea  
or constipation       
    
 Genitourinary Denies: painful urination       
    
 Musculoskeletal Denies: back pain or neck pain       
    
 Integumentary/Breast Denies: rash       
    
 Neurological Reports: headache; Denies: numbness in extremities, weakness in   
extremities or dizziness       
    
 Endocrine Denies: excessive urination       
    
 Hematologic/Lymphatic Denies: easy bruising or easy bleeding       
    
    
PFSH    
PFSH    
Social History (Reviewed 10/24/24 @ 13:53 by SUZANNE Avalos)    
Little interest or pleasure in doing things:  not at all     
Feeling down, depressed, or hopeless:  not at all     
    
    
    
Exam    
Narrative    
Exam Narrative:     
Gen.: Awake, alert, in no distress, morbidly obese resting with sunglasses    
Head: Normocephalic, atraumatic    
ENT: Moist mucous membranes    
Respiratory: No respiratory distress, lungs clear bilaterally    
Cardio: Regular rate and rhythm    
Gastrointestinal: Abdomen is soft, nondistended and tender to palpation in the   
epigastrium with no guarding or rebound    
Extremities: Moves extremities equally, no injuries noted    
Psych: Normal mood and affect    
Neuro: No focal neuro deficit    
Skin: Warm, dry, intact    
    
Constitutional    
Vital Signs, click to edit/add:     
    
                                Last Vital Signs    
    
    
    
Temp  98.5 F   10/24/24 13:27    
     
Pulse  50 L  10/24/24 13:27    
     
Resp  18   10/24/24 13:27    
     
BP  152/88 H  10/24/24 13:27    
     
Pulse Ox  99   10/24/24 13:27    
     
O2 Del Method  Room Air  10/24/24 13:27    
    
    
    
    
    
Course    
Vital Signs    
Vital signs:     
    
                                   Vital Signs    
    
    
    
Temperature  98.5 F   10/24/24 13:27    
     
Pulse Rate  50 L  10/24/24 13:27    
     
Respiratory Rate  18   10/24/24 13:27    
     
Blood Pressure  152/88 H  10/24/24 13:27    
     
Pulse Oximetry  99   10/24/24 13:27    
     
Oxygen Delivery Method  Room Air  10/24/24 13:27    
    
    
                                            
    
    
    
Temperature  98.5 F   10/24/24 13:27    
     
Pulse Rate  50 L  10/24/24 13:27    
     
Respiratory Rate  18   10/24/24 13:27    
     
Blood Pressure  152/88 H  10/24/24 13:27    
     
Pulse Oximetry  99   10/24/24 13:27    
     
Oxygen Delivery Method  Room Air  10/24/24 13:27    
    
    
    
    
    
Medical Decision Making    
MDM Narrative    
Medical decision making narrative:     
Patient treated with Dilaudid, IV fluids, Zofran, Norflex for abdominal pain and  
headache.  CT of the brain is unremarkable, laboratory studies reviewed and   
noted within normal limits.  He has no EKG changes.  Ultrasound of the right   
upper quadrant shows possible debris versus small gallstones with no obvious   
evidence of acute cholecystitis.  Patient with a negative Durbin sign, no focal   
tenderness in the right upper quadrant and no laboratory findings concerning for  
acute cholecystitis.  He was given instructions for a gallbladder diet for home,  
medications for headache and abdominal pain.  He was instructed to follow-up   
with primary care and general surgery and return to the emergency department if   
symptoms change or worsen.    
    
    
SUPERVISED APC VISIT, PHYSICIAN ATTESTATION:     
    
Based on the medical record the care appears appropriate.    
    
?    
Medical Records    
Medical records reviewed: Yes I reviewed the patient's medical records    
Lab Data    
Lab results reviewed: Yes I reviewed the patient's lab results    
Labs:     
    
                                   Lab Results    
    
    
    
  10/24/24 10/24/24 Range/Units    
    
  14:00 15:40     
     
WBC  7.7   (4.0-11.0)  10^3/uL    
     
RBC  5.11   (4.70-6.10)  10^6/uL    
     
Hgb  15.4   (14.0-18.0)  g/dL    
     
Hct  44.5   (42.0-54.0)  %    
     
MCV  87.1   (80.0-94.0)  fL    
     
MCH  30.1   (25.9-34.0)  pg    
     
MCHC  34.6   (29.9-35.2)  g/dL    
     
RDW  12.4   (11.0-15.0)  %    
     
Plt Count  112 L   (150-450)  10^3/uL    
     
MPV  11.2   (9.5-13.5)  fL    
     
Neut % (Auto)  74.8   (43.0-75.0)  %    
     
Lymph % (Auto)  14.1 L   (20.5-60.0)  %    
     
Mono % (Auto)  6.9   (1.7-12.0)  %    
     
Eos % (Auto)  3.4   (0.9-7.0)  %    
     
Baso % (Auto)  0.3   (0.2-2.0)  %    
     
Neut # (Auto)  5.7   (1.4-6.5)  10^3/uL    
     
Lymph # (Auto)  1.1 L   (1.2-3.8)  10^3/uL    
     
Mono # (Auto)  0.5   (0.3-0.8)  10^3/uL    
     
Eos # (Auto)  0.3   (0.0-0.7)  10^3/uL    
     
Baso # (Auto)  0.0   (0.0-0.1)  10^3/uL    
     
Abs Immat Gran (auto)  0.04 H   (0.00-0.03)  10^3/uL    
     
Imm/Tot Granulo (auto)  0.5   (0.0-0.5)  %    
     
PT  10.9   (9.0-11.6)  sec    
     
INR  1.03       
     
Sodium  141   (136-145)  mmol/L    
     
Potassium  4.1   (3.5-5.1)  mmol/L    
     
Chloride  104   ()  mmol/L    
     
Carbon Dioxide  26.3   (21.0-32.0)  mmol/L    
     
Anion Gap  14.8       
     
BUN  18.0   (7.0-18.0)  mg/dL    
     
Creatinine  0.78   (0.70-1.30)  mg/dL    
     
Est GFR ( Amer)  >60   (>=60 mL/min/1.73m^2)      
     
Est GFR (Non-Af Amer)  >60   (>=60 mL/min/1.73m^2)      
     
BUN/Creatinine Ratio  23.1       
     
Glucose  102   ()  mg/dL    
     
Lactate  0.7   (0.4-2.0)  mmol/L    
     
Calcium  9.0   (8.5-10.1)  mg/dL    
     
Total Bilirubin  0.9   (0.2-1.0)  mg/dL    
     
AST  16   (15-37)  U/L    
     
ALT  21   (16-63)  U/L    
     
Alkaline Phosphatase  108   ()  U/L    
     
Troponin I High Sens  <4.0 L   (4.0-76.1)  pg/mL    
     
Total Protein  7.5   (6.4-8.2)  g/dL    
     
Albumin  3.6   (3.4-5.0)  g/dL    
     
Globulin  3.9   g/dL    
     
Albumin/Globulin Ratio  0.9       
     
Lipase  15.0 L   (16.0-77.0)  U/L    
     
Urine Color   Yellow  (YELLOW)      
     
Urine Clarity   Clear  (CLEAR)      
     
Urine pH   6.5  (5.0-9.0)      
     
Ur Specific Gravity   1.020  (1.005-1.025)      
     
Urine Protein   Negative  (NEG/TRACE)  mg/dL    
     
Urine Glucose (UA)   Negative  (NEGATIVE)  mg/dL    
     
Urine Ketones   Negative  (NEGATIVE)  mg/dL    
     
Urine Occult Blood   Negative  (NEGATIVE)      
     
Urine Nitrite   Negative  (NEGATIVE)      
     
Urine Bilirubin   Negative  (NEGATIVE)      
     
Urine Urobilinogen   1.0  (0.2-1.0)  EU/dL    
     
Ur Leukocyte Esterase   Negative  (NEGATIVE)      
    
    
    
    
Imaging Data    
US - abdomen:     
      Attestation: I have reviewed the pertinent imaging results.    
      Radiologist's impression:     
    
ITS Impressions    
    
Upper Quadrant Ultrasound  10/24/24 13:40    
IMPRESSION:     
     
1. Limited examination due to patient body habitus.    
2. No convincing acute abnormality. If there remains clinical concern consider     
     
CT abdomen and pelvis with IV and oral contrast.    
     
     
Electronically authenticated by: ZOE DALY   Date: 10/24/2024  16:12    
    
    
Head CT  10/24/24 13:51    
IMPRESSION:    
1. No acute intracranial abnormality.    
     
     
Electronically authenticated by: ALEX GUARDADO   Date: 10/24/2024  14:24    
    
    
    
    
ECG Data    
Attestation: I personally reviewed and interpreted this ECG as follows: (Sinus   
bradycardia at a rate of 44, no acute ST elevation or ectopy.  EKG reviewed by   
attending physician)    
    
Discharge Plan    
Discharge    
Clinical Impression:    
 Abdominal pain, Gallstones, Headache    
    
    
Patient Disposition: Home, Self-Care    
    
Time of Disposition Decision: 16:40    
    
Condition: Good    
    
Prescriptions / Home Meds:    
New    
  butalbital-acetaminophen-caff [Fioricet] -40 mg capsule     
   1 cap PO Q6H PRN (Reason: headache) Qty: 12 0RF    
   Rx Instructions:    
   DX: R51.9    
  ketorolac 10 mg tablet     
   10 mg PO TID PRN (Reason: pain) Qty: 10 0RF    
  famotidine [Pepcid] 20 mg tablet     
   20 mg PO BID Qty: 10 0RF    
  hyoscyamine sulfate [Levsin] 0.125 mg tablet     
   0.125 mg PO Q6H PRN (Reason: abdominal pain) Qty: 12 0RF    
  ondansetron 4 mg tablet,disintegrating     
   4 mg PO Q6H PRN (Reason: nausea and vomiting) Qty: 12 0RF    
    
No Action    
  gabapentin 600 mg tablet     
   600 mg PO TID     
  amlodipine 5 mg tablet     
   5 mg PO DAILY     
  quetiapine 100 mg tablet     
   200 mg PO BID     
  simvastatin 20 mg tablet     
   20 mg PO BEDTIME     
  trazodone 150 mg tablet     
   150 mg PO BEDTIME     
  bupropion HCl 150 mg tablet extended release 24 hr     
   300 mg PO BID     
    
Print Language: English    
    
Instructions:  Gallstones (ED), Acute Headache (ED), Acute Abdominal Pain (ED)    
    
Referrals:    
Dion Mallory DO [Physician] - As needed    
Physician,Non-Staff, MD [Primary Care Provider] - 1 week

## 2024-10-24 NOTE — CT_ITS
The [address] [address] 
     [phone] 
  
  
Patient Name: 
MIN GOLD 
  
MRN: [id number]    YOB: 1990    Sex: M 
Assigned Patient Location: ER 
Current Patient Location: ER 
Accession/Order Number: [id number] 
Exam Date: 10/24/2024  14:10    Report Date: 10/24/2024  14:24 
  
At the request of: 
ZORAIDA NOYOLA   
  
Procedure:  CT head/brain wo con 
  
EXAM: CT head/brain wo con  
  
HISTORY: headache 
  
COMPARISON: None.  
  
TECHNIQUE: Axial soft tissue and bone windows through the calvarium with  
coronal and sagittal reformats. CT dose reduction technique was used including  
  
Automated Exposure Control. 
  
 Findings: 
  
The paranasal sinuses and mastoid air cells are well aerated. No air-fluid  
levels. 
  
No extra-axial fluid collection. No intra-axial or extra-axial bleed. No mass  
effect or midline shift. The gray-white matter differentiation is preserved.  
The brain parenchymal volume is age appropriate. The ventricles are  
nondilated.  
The basal cisterns are patent. There is cerebellar tonsillar ectopia. 
  
ORDER #: [id number] CT/CT head/brain wo con  
IMPRESSION:  
1. No acute intracranial abnormality.  
   
   
Electronically authenticated by: ALEX GUARDADO   Date: 10/24/2024  14:24

## 2024-11-05 ENCOUNTER — TELEPHONE (OUTPATIENT)
Dept: GASTROENTEROLOGY | Age: 34
End: 2024-11-05

## 2024-11-25 ENCOUNTER — OFFICE VISIT (OUTPATIENT)
Dept: GASTROENTEROLOGY | Age: 34
End: 2024-11-25
Payer: COMMERCIAL

## 2024-11-25 VITALS
BODY MASS INDEX: 57.28 KG/M2 | HEART RATE: 50 BPM | RESPIRATION RATE: 18 BRPM | WEIGHT: 315 LBS | SYSTOLIC BLOOD PRESSURE: 150 MMHG | DIASTOLIC BLOOD PRESSURE: 88 MMHG | TEMPERATURE: 97.2 F | OXYGEN SATURATION: 94 %

## 2024-11-25 DIAGNOSIS — B18.1 CHRONIC VIRAL HEPATITIS B WITHOUT DELTA AGENT AND WITHOUT COMA (HCC): Primary | ICD-10-CM

## 2024-11-25 DIAGNOSIS — Z20.5 PERINATAL HEPATITIS C EXPOSURE: ICD-10-CM

## 2024-11-25 PROCEDURE — 3079F DIAST BP 80-89 MM HG: CPT | Performed by: INTERNAL MEDICINE

## 2024-11-25 PROCEDURE — G8417 CALC BMI ABV UP PARAM F/U: HCPCS | Performed by: INTERNAL MEDICINE

## 2024-11-25 PROCEDURE — G8484 FLU IMMUNIZE NO ADMIN: HCPCS | Performed by: INTERNAL MEDICINE

## 2024-11-25 PROCEDURE — G8427 DOCREV CUR MEDS BY ELIG CLIN: HCPCS | Performed by: INTERNAL MEDICINE

## 2024-11-25 PROCEDURE — 3077F SYST BP >= 140 MM HG: CPT | Performed by: INTERNAL MEDICINE

## 2024-11-25 PROCEDURE — 4004F PT TOBACCO SCREEN RCVD TLK: CPT | Performed by: INTERNAL MEDICINE

## 2024-11-25 PROCEDURE — 99204 OFFICE O/P NEW MOD 45 MIN: CPT | Performed by: INTERNAL MEDICINE

## 2024-11-25 RX ORDER — TRAZODONE HYDROCHLORIDE 150 MG/1
150 TABLET ORAL NIGHTLY
COMMUNITY
Start: 2024-11-01

## 2024-11-25 RX ORDER — SIMVASTATIN 20 MG
20 TABLET ORAL NIGHTLY
COMMUNITY
Start: 2024-11-01

## 2024-11-25 RX ORDER — GABAPENTIN 600 MG/1
600 TABLET ORAL 3 TIMES DAILY
COMMUNITY
Start: 2024-11-01

## 2024-11-25 RX ORDER — AMLODIPINE BESYLATE 5 MG/1
5 TABLET ORAL DAILY
COMMUNITY
Start: 2024-03-30

## 2024-11-25 RX ORDER — NAPROXEN 500 MG/1
500 TABLET ORAL 2 TIMES DAILY PRN
COMMUNITY
Start: 2024-11-01

## 2024-11-25 RX ORDER — BUPROPION HYDROCHLORIDE 150 MG/1
150 TABLET ORAL EVERY MORNING
COMMUNITY
Start: 2024-03-30

## 2024-11-25 RX ORDER — FAMOTIDINE 20 MG/1
20 TABLET, FILM COATED ORAL 2 TIMES DAILY
COMMUNITY
Start: 2024-10-24

## 2024-11-25 ASSESSMENT — ENCOUNTER SYMPTOMS
ABDOMINAL DISTENTION: 0
VOICE CHANGE: 0
NAUSEA: 0
VOMITING: 1
CONSTIPATION: 0
TROUBLE SWALLOWING: 0
BLOOD IN STOOL: 0
WHEEZING: 0
CHOKING: 0
ANAL BLEEDING: 0
RECTAL PAIN: 0
SORE THROAT: 0
COUGH: 0
DIARRHEA: 0
ABDOMINAL PAIN: 1

## 2024-11-25 NOTE — PROGRESS NOTES
Reason for Referral:   Param Baird, DO  [address] [place] [address]    Chief Complaint   Patient presents with    New Patient     Referred for ascites and intermittent RUQ pain that has progressively gotten worse over the past 6 months.        HISTORY OF PRESENT ILLNESS: Mr.Dustin MONSERRAT Healy is a 34 y.o. male , referred for evaluation of chronic hep b.    Here to establish care  Has been seen by Marilee SMILEY in the past for decompensated cirrhosis likely hep b w underlying NAFLD  Last hep B DNA was 53 on 3/30/24  D/E antigen negative  Hep c undetectable  Last LFTs were 5/24/24 and were WNL. No anemia  Did have cirrhosis with ascites and complications, not any more   Was on Tenofovir , ran out of it for 1 year     CT abd pelvis 5/24/24   IMPRESSION:  1. No urinary calculi or acute inflammation of the urinary system.  2. Severe splenomegaly.  3. Markedly atrophic left hepatic lobe and pancreas.        Past Medical,Family, and Social History reviewed and does contribute to the patient presentingcondition.        I did review all the labs results available for the labs which were ordered by the primary care physician, and the other consultants, we search on [facility] at [facility] and all the available care everywhere epic    I did review all the imaging studies of the abdomen available on EMR, ordered by the primary care physician and the other consultant    I did review all the pathology from the biopsies done on the previous endoscopies        Patient's PMH/PSH,SH,PSYCH Hx, MEDs, ALLERGIES, and ROS were all reviewed and updated in the appropriate sections.    PAST MEDICAL HISTORY:  Past Medical History:   Diagnosis Date    Hepatitis C     Hepatitis C 12/4/2019    Heroin abuse (HCC)     HTN (hypertension) 12/10/2012    Morbid obesity 12/10/2012       Past Surgical History:   Procedure Laterality Date    WISDOM TOOTH EXTRACTION      age 18       CURRENT MEDICATIONS:    Current Outpatient Medications:

## 2024-12-12 ENCOUNTER — TELEPHONE (OUTPATIENT)
Dept: GASTROENTEROLOGY | Age: 34
End: 2024-12-12

## 2024-12-12 NOTE — TELEPHONE ENCOUNTER
Patient left a voice message in regards to their CT order that they are trying to get done and that they needed us to send over the paperwork to [facility].

## 2024-12-26 ENCOUNTER — HOSPITAL ENCOUNTER (OUTPATIENT)
Age: 34
Discharge: HOME | End: 2024-12-26
Payer: COMMERCIAL

## 2024-12-26 ENCOUNTER — HOSPITAL ENCOUNTER (OUTPATIENT)
Dept: CT IMAGING | Age: 34
Discharge: HOME | End: 2024-12-28
Payer: COMMERCIAL

## 2024-12-26 DIAGNOSIS — Z20.5 PERINATAL HEPATITIS C EXPOSURE: ICD-10-CM

## 2024-12-26 LAB
CREAT SERPL-MCNC: 0.7 MG/DL (ref 0.7–1.2)
GFR, ESTIMATED: >90 ML/MIN/1.73M2

## 2024-12-26 PROCEDURE — 6360000004 HC RX CONTRAST MEDICATION: Performed by: INTERNAL MEDICINE

## 2024-12-26 PROCEDURE — 36415 COLL VENOUS BLD VENIPUNCTURE: CPT

## 2024-12-26 PROCEDURE — 82565 ASSAY OF CREATININE: CPT

## 2024-12-26 PROCEDURE — 74177 CT ABD & PELVIS W/CONTRAST: CPT

## 2024-12-26 RX ORDER — IOPAMIDOL 755 MG/ML
75 INJECTION, SOLUTION INTRAVASCULAR
Status: COMPLETED | OUTPATIENT
Start: 2024-12-26 | End: 2024-12-26

## 2024-12-26 RX ADMIN — IOPAMIDOL 75 ML: 755 INJECTION, SOLUTION INTRAVENOUS at 15:20

## 2025-01-06 ENCOUNTER — TELEPHONE (OUTPATIENT)
Dept: GASTROENTEROLOGY | Age: 35
End: 2025-01-06

## 2025-01-06 ENCOUNTER — OFFICE VISIT (OUTPATIENT)
Dept: GASTROENTEROLOGY | Age: 35
End: 2025-01-06

## 2025-01-06 VITALS
HEART RATE: 72 BPM | SYSTOLIC BLOOD PRESSURE: 151 MMHG | TEMPERATURE: 97.3 F | WEIGHT: 315 LBS | BODY MASS INDEX: 57.51 KG/M2 | DIASTOLIC BLOOD PRESSURE: 81 MMHG | OXYGEN SATURATION: 96 % | RESPIRATION RATE: 20 BRPM

## 2025-01-06 DIAGNOSIS — R10.11 RUQ ABDOMINAL PAIN: ICD-10-CM

## 2025-01-06 DIAGNOSIS — B18.1 CHRONIC VIRAL HEPATITIS B WITHOUT DELTA AGENT AND WITHOUT COMA (HCC): Primary | ICD-10-CM

## 2025-01-06 DIAGNOSIS — Z20.5 PERINATAL HEPATITIS C EXPOSURE: ICD-10-CM

## 2025-01-06 RX ORDER — FAMOTIDINE 20 MG/1
20 TABLET, FILM COATED ORAL 2 TIMES DAILY
Qty: 60 TABLET | Refills: 5 | Status: SHIPPED | OUTPATIENT
Start: 2025-01-06

## 2025-01-06 ASSESSMENT — ENCOUNTER SYMPTOMS
DIARRHEA: 0
CONSTIPATION: 0
VOICE CHANGE: 0
COUGH: 0
BLOOD IN STOOL: 0
WHEEZING: 0
SORE THROAT: 0
TROUBLE SWALLOWING: 0
RECTAL PAIN: 0
CHOKING: 0
ANAL BLEEDING: 0
ABDOMINAL DISTENTION: 0
ABDOMINAL PAIN: 1
NAUSEA: 1
VOMITING: 1

## 2025-01-06 NOTE — TELEPHONE ENCOUNTER
Writer called pt to remind that there were incomplete labs that should be done prior to appt. Pt verbalizes understanding. Call ends.

## 2025-01-06 NOTE — PROGRESS NOTES
GI CLINIC FOLLOW UP    INTERVAL HISTORY:   No referring provider defined for this encounter.    Chief Complaint   Patient presents with    Follow-up     6 week follow up with complaints of occasional abdominal pain, nausea and vomiting.           HISTORY OF PRESENT ILLNESS: Mr.Dustin MONSERRAT Healy is a 34 y.o. male , referred for evaluation of chronic hep b, h/o hep c, gerd.     Here for f/u  Previously followed with [facility]   Last labs completed 3/20/24 showing HBV DNA 53, e antigen negative  HCV <15  LFTs normalized 5/24/24    He has not yet done his hep b labs  Has not been taking his entecavir states he ran out of refills  Requesting refill of pepcid for his gerd  He is prescribed bumex/spironolactone but not taking - feels like he does not need it - has been prescribed by his cardiologist in the past. States he does not know why he was seeing cardiology.    Following with general surgery for his gallbladder  Hoping for a cholecystectomy  Has not done his HIDA scan     Denies fever/chills, loss of appetite, unintentional weight loss, dysphagia/odynophagia, n/v, abd pain, diarrhea/constipation, black or bloody stools. No jaundice/icterus    Last CT abd pelvis - 12/26/24 - IMPRESSION:  1. No acute intra-abdominal or pelvic process.  2. Hepatosplenomegaly similar to prior comparison.  Details as above without  new suspicious liver lesion or ascites development.  No progressive bulky  abdominal adenopathy.  Continued correlation with LFTs and lab values  3. Mild colonic stool burden.       Past Medical,Family, and Social History reviewed and does contribute to the patient presentingcondition.    I did review all the labs results available for the labs which were ordered by the primary care physician, and the other consultants, we search on [place] [facility] and all the available care everywhere epic    I did review all the imaging studies of the abdomen available on EMR, ordered by the primary care physician and

## 2025-01-07 ENCOUNTER — TELEPHONE (OUTPATIENT)
Dept: GASTROENTEROLOGY | Age: 35
End: 2025-01-07

## 2025-01-07 NOTE — TELEPHONE ENCOUNTER
Procedure scheduled/Dr Sanchez  Procedure:egd  Dx:RUQ abdominal pain   Date:  Time:  Hospital:  [facility] phone call:  Bowel Prep instructions given:  In office/via phone:   Clearance needed:none

## 2025-01-14 ENCOUNTER — TELEPHONE (OUTPATIENT)
Dept: GASTROENTEROLOGY | Age: 35
End: 2025-01-14

## 2025-01-14 NOTE — TELEPHONE ENCOUNTER
Hannah from [facility] is requesting a call regarding patient procedure and can be reached at [phone]. Okay to leave a message

## 2025-02-03 ENCOUNTER — PREP FOR PROCEDURE (OUTPATIENT)
Dept: GASTROENTEROLOGY | Age: 35
End: 2025-02-03

## 2025-08-28 ENCOUNTER — HOSPITAL ENCOUNTER (OUTPATIENT)
Dept: ULTRASOUND IMAGING | Age: 35
Discharge: HOME | End: 2025-08-30
Payer: COMMERCIAL

## 2025-08-28 DIAGNOSIS — K70.31 ALCOHOLIC CIRRHOSIS OF LIVER WITH ASCITES (HCC): ICD-10-CM

## 2025-08-28 DIAGNOSIS — R18.8 OTHER ASCITES: ICD-10-CM

## 2025-08-28 PROCEDURE — 76705 ECHO EXAM OF ABDOMEN: CPT
